# Patient Record
Sex: MALE | Race: WHITE | NOT HISPANIC OR LATINO
[De-identification: names, ages, dates, MRNs, and addresses within clinical notes are randomized per-mention and may not be internally consistent; named-entity substitution may affect disease eponyms.]

---

## 2019-12-31 ENCOUNTER — TRANSCRIPTION ENCOUNTER (OUTPATIENT)
Age: 74
End: 2019-12-31

## 2020-10-16 ENCOUNTER — OUTPATIENT (OUTPATIENT)
Dept: OUTPATIENT SERVICES | Facility: HOSPITAL | Age: 75
LOS: 1 days | End: 2020-10-16

## 2020-10-27 ENCOUNTER — APPOINTMENT (OUTPATIENT)
Dept: DISASTER EMERGENCY | Facility: CLINIC | Age: 75
End: 2020-10-27

## 2020-10-28 LAB — SARS-COV-2 N GENE NPH QL NAA+PROBE: NOT DETECTED

## 2020-10-30 ENCOUNTER — TRANSCRIPTION ENCOUNTER (OUTPATIENT)
Age: 75
End: 2020-10-30

## 2020-10-30 ENCOUNTER — INPATIENT (INPATIENT)
Facility: HOSPITAL | Age: 75
LOS: 1 days | Discharge: HOME HEALTH | End: 2020-11-01
Payer: MEDICARE

## 2020-10-30 PROCEDURE — 73560 X-RAY EXAM OF KNEE 1 OR 2: CPT | Mod: 26,LT

## 2020-12-07 ENCOUNTER — APPOINTMENT (OUTPATIENT)
Dept: ULTRASOUND IMAGING | Facility: CLINIC | Age: 75
End: 2020-12-07
Payer: MEDICARE

## 2020-12-07 ENCOUNTER — OUTPATIENT (OUTPATIENT)
Dept: OUTPATIENT SERVICES | Facility: HOSPITAL | Age: 75
LOS: 1 days | End: 2020-12-07
Payer: MEDICARE

## 2020-12-07 ENCOUNTER — APPOINTMENT (OUTPATIENT)
Dept: ORTHOPEDIC SURGERY | Facility: CLINIC | Age: 75
End: 2020-12-07
Payer: MEDICARE

## 2020-12-07 ENCOUNTER — RESULT REVIEW (OUTPATIENT)
Age: 75
End: 2020-12-07

## 2020-12-07 VITALS
SYSTOLIC BLOOD PRESSURE: 118 MMHG | DIASTOLIC BLOOD PRESSURE: 77 MMHG | TEMPERATURE: 97.3 F | BODY MASS INDEX: 29.62 KG/M2 | HEART RATE: 82 BPM | WEIGHT: 200 LBS | HEIGHT: 69 IN

## 2020-12-07 DIAGNOSIS — Z86.39 PERSONAL HISTORY OF OTHER ENDOCRINE, NUTRITIONAL AND METABOLIC DISEASE: ICD-10-CM

## 2020-12-07 DIAGNOSIS — Z96.652 PRESENCE OF LEFT ARTIFICIAL KNEE JOINT: ICD-10-CM

## 2020-12-07 PROCEDURE — 93971 EXTREMITY STUDY: CPT | Mod: 26,LT

## 2020-12-07 PROCEDURE — 99024 POSTOP FOLLOW-UP VISIT: CPT

## 2020-12-07 PROCEDURE — 93971 EXTREMITY STUDY: CPT

## 2021-01-25 ENCOUNTER — TRANSCRIPTION ENCOUNTER (OUTPATIENT)
Age: 76
End: 2021-01-25

## 2021-01-28 ENCOUNTER — FORM ENCOUNTER (OUTPATIENT)
Age: 76
End: 2021-01-28

## 2021-02-08 ENCOUNTER — APPOINTMENT (OUTPATIENT)
Dept: ORTHOPEDIC SURGERY | Facility: CLINIC | Age: 76
End: 2021-02-08

## 2021-04-03 ENCOUNTER — TRANSCRIPTION ENCOUNTER (OUTPATIENT)
Age: 76
End: 2021-04-03

## 2021-04-20 ENCOUNTER — TRANSCRIPTION ENCOUNTER (OUTPATIENT)
Age: 76
End: 2021-04-20

## 2021-05-14 ENCOUNTER — RX RENEWAL (OUTPATIENT)
Age: 76
End: 2021-05-14

## 2021-07-12 ENCOUNTER — APPOINTMENT (OUTPATIENT)
Dept: ORTHOPEDIC SURGERY | Facility: CLINIC | Age: 76
End: 2021-07-12
Payer: MEDICARE

## 2021-07-12 VITALS — SYSTOLIC BLOOD PRESSURE: 124 MMHG | DIASTOLIC BLOOD PRESSURE: 84 MMHG | HEART RATE: 68 BPM

## 2021-07-12 DIAGNOSIS — R29.4 CLICKING HIP: ICD-10-CM

## 2021-07-12 PROCEDURE — 99213 OFFICE O/P EST LOW 20 MIN: CPT

## 2021-08-02 ENCOUNTER — APPOINTMENT (OUTPATIENT)
Dept: ORTHOPEDIC SURGERY | Facility: CLINIC | Age: 76
End: 2021-08-02
Payer: MEDICARE

## 2021-08-02 VITALS — SYSTOLIC BLOOD PRESSURE: 147 MMHG | HEART RATE: 51 BPM | DIASTOLIC BLOOD PRESSURE: 90 MMHG

## 2021-08-02 PROCEDURE — 99213 OFFICE O/P EST LOW 20 MIN: CPT

## 2021-08-12 DIAGNOSIS — Z82.49 FAMILY HISTORY OF ISCHEMIC HEART DISEASE AND OTHER DISEASES OF THE CIRCULATORY SYSTEM: ICD-10-CM

## 2021-08-12 DIAGNOSIS — Z80.3 FAMILY HISTORY OF MALIGNANT NEOPLASM OF BREAST: ICD-10-CM

## 2021-08-12 DIAGNOSIS — Z78.9 OTHER SPECIFIED HEALTH STATUS: ICD-10-CM

## 2021-08-12 DIAGNOSIS — Z81.1 FAMILY HISTORY OF ALCOHOL ABUSE AND DEPENDENCE: ICD-10-CM

## 2021-09-20 ENCOUNTER — APPOINTMENT (OUTPATIENT)
Dept: FAMILY MEDICINE | Facility: CLINIC | Age: 76
End: 2021-09-20
Payer: MEDICARE

## 2021-09-20 VITALS
HEIGHT: 69 IN | HEART RATE: 85 BPM | SYSTOLIC BLOOD PRESSURE: 130 MMHG | OXYGEN SATURATION: 98 % | TEMPERATURE: 97.9 F | WEIGHT: 198 LBS | DIASTOLIC BLOOD PRESSURE: 84 MMHG | BODY MASS INDEX: 29.33 KG/M2

## 2021-09-20 DIAGNOSIS — Z00.00 ENCOUNTER FOR GENERAL ADULT MEDICAL EXAMINATION W/OUT ABNORMAL FINDINGS: ICD-10-CM

## 2021-09-20 DIAGNOSIS — G47.00 INSOMNIA, UNSPECIFIED: ICD-10-CM

## 2021-09-20 DIAGNOSIS — Z13.6 ENCOUNTER FOR SCREENING FOR CARDIOVASCULAR DISORDERS: ICD-10-CM

## 2021-09-20 DIAGNOSIS — C44.611 BASAL CELL CARCINOMA OF SKIN OF UNSPECIFIED UPPER LIMB, INCLUDING SHOULDER: ICD-10-CM

## 2021-09-20 PROCEDURE — G0439: CPT

## 2021-09-20 PROCEDURE — 90686 IIV4 VACC NO PRSV 0.5 ML IM: CPT

## 2021-09-20 PROCEDURE — 36415 COLL VENOUS BLD VENIPUNCTURE: CPT

## 2021-09-20 PROCEDURE — G0008: CPT

## 2021-09-20 NOTE — PLAN
[FreeTextEntry1] : Fasting labs were performed today . Pt scheduled with Cardiology followup next week \par Advised to undergo any preventative testing that is overdue . Flu shot administered today \par Patient will continue healthy eating and exercise and followup in one year for PE\par

## 2021-09-20 NOTE — HISTORY OF PRESENT ILLNESS
[de-identified] : Patient is here for yearly physical. He denies any new complaints . He is UTD with all preventative testing , dentist, Optho(reading)  and Derm .\par Patient is eating healthy  and exercising . Patient is Covid Vaccinated. \par Patient is fasting for physical labs today\par

## 2021-09-21 ENCOUNTER — TRANSCRIPTION ENCOUNTER (OUTPATIENT)
Age: 76
End: 2021-09-21

## 2021-09-21 LAB
ALBUMIN SERPL ELPH-MCNC: 4.5 G/DL
ALP BLD-CCNC: 56 U/L
ALT SERPL-CCNC: 13 U/L
ANION GAP SERPL CALC-SCNC: 13 MMOL/L
AST SERPL-CCNC: 16 U/L
BILIRUB SERPL-MCNC: 0.7 MG/DL
BUN SERPL-MCNC: 19 MG/DL
CALCIUM SERPL-MCNC: 9.1 MG/DL
CHLORIDE SERPL-SCNC: 104 MMOL/L
CHOLEST SERPL-MCNC: 182 MG/DL
CO2 SERPL-SCNC: 23 MMOL/L
CREAT SERPL-MCNC: 1.4 MG/DL
ESTIMATED AVERAGE GLUCOSE: 120 MG/DL
GLUCOSE SERPL-MCNC: 99 MG/DL
HBA1C MFR BLD HPLC: 5.8 %
HDLC SERPL-MCNC: 75 MG/DL
LDLC SERPL CALC-MCNC: 91 MG/DL
NONHDLC SERPL-MCNC: 107 MG/DL
POTASSIUM SERPL-SCNC: 4.5 MMOL/L
PROT SERPL-MCNC: 7.5 G/DL
PSA SERPL-MCNC: 5.13 NG/ML
SODIUM SERPL-SCNC: 139 MMOL/L
TRIGL SERPL-MCNC: 82 MG/DL

## 2021-09-22 ENCOUNTER — TRANSCRIPTION ENCOUNTER (OUTPATIENT)
Age: 76
End: 2021-09-22

## 2021-09-27 ENCOUNTER — NON-APPOINTMENT (OUTPATIENT)
Age: 76
End: 2021-09-27

## 2021-09-27 ENCOUNTER — APPOINTMENT (OUTPATIENT)
Dept: ORTHOPEDIC SURGERY | Facility: CLINIC | Age: 76
End: 2021-09-27
Payer: MEDICARE

## 2021-09-27 VITALS
SYSTOLIC BLOOD PRESSURE: 140 MMHG | BODY MASS INDEX: 29.33 KG/M2 | WEIGHT: 198 LBS | HEIGHT: 69 IN | HEART RATE: 80 BPM | DIASTOLIC BLOOD PRESSURE: 76 MMHG

## 2021-09-27 DIAGNOSIS — Z96.652 PRESENCE OF LEFT ARTIFICIAL KNEE JOINT: ICD-10-CM

## 2021-09-27 PROCEDURE — 99213 OFFICE O/P EST LOW 20 MIN: CPT

## 2021-11-27 NOTE — PHYSICAL EXAM
[No Carotid Bruits] : no carotid bruits [No Edema] : there was no peripheral edema [Grossly Normal Strength/Tone] : grossly normal strength/tone [No Rash] : no rash [No Focal Deficits] : no focal deficits [Normal] : affect was normal and insight and judgment were intact

## 2021-12-01 ENCOUNTER — APPOINTMENT (OUTPATIENT)
Dept: FAMILY MEDICINE | Facility: CLINIC | Age: 76
End: 2021-12-01
Payer: MEDICARE

## 2021-12-01 VITALS
HEART RATE: 92 BPM | SYSTOLIC BLOOD PRESSURE: 124 MMHG | HEIGHT: 69 IN | TEMPERATURE: 98.3 F | DIASTOLIC BLOOD PRESSURE: 82 MMHG | OXYGEN SATURATION: 96 % | BODY MASS INDEX: 29.62 KG/M2 | WEIGHT: 200 LBS

## 2021-12-01 PROCEDURE — 99213 OFFICE O/P EST LOW 20 MIN: CPT

## 2021-12-02 ENCOUNTER — TRANSCRIPTION ENCOUNTER (OUTPATIENT)
Age: 76
End: 2021-12-02

## 2021-12-02 NOTE — ADDENDUM
[FreeTextEntry1] : His EKG from Dr. Myers's office is within normal limits. He is medically optimized for cataract surgery.

## 2021-12-02 NOTE — ASSESSMENT
[FreeTextEntry4] : He has a history of hypertension and chronic renal failure. His most recent eGFR is 49. His blood pressure is well controlled and he does not need blood work for his preop clearance.\par \par He had an EKG done with his cardiologist earlier this year. Will request a copy of this result to include with his clearance note. Assuming the EKG was normal or stable, he may proceed with cataract surgery.

## 2021-12-02 NOTE — HISTORY OF PRESENT ILLNESS
[No Pertinent Cardiac History] : no history of aortic stenosis, atrial fibrillation, coronary artery disease, recent myocardial infarction, or implantable device/pacemaker [No Pertinent Pulmonary History] : no history of asthma, COPD, sleep apnea, or smoking [No Adverse Anesthesia Reaction] : no adverse anesthesia reaction in self or family member [Chronic Kidney Disease] : chronic kidney disease [(Patient denies any chest pain, claudication, dyspnea on exertion, orthopnea, palpitations or syncope)] : Patient denies any chest pain, claudication, dyspnea on exertion, orthopnea, palpitations or syncope [Chronic Anticoagulation] : no chronic anticoagulation [Diabetes] : no diabetes [FreeTextEntry1] : right eye cataract removal  [FreeTextEntry2] : 12/8/21 [FreeTextEntry3] : Dr. Duncan [FreeTextEntry4] : He had a cataract in his left eye which was removed 3-4 years ago. He is now scheduled for surgery on the right eye. He has had blurry vision in this eye for some time.

## 2021-12-25 NOTE — HEALTH RISK ASSESSMENT
[Very Good] : ~his/her~  mood as very good [Yes] : Yes [Monthly or less (1 pt)] : Monthly or less (1 point) Acute respiratory failure with hypoxia [3 or 4 (1 pt)] : 3 or 4  (1 point) [Never (0 pts)] : Never (0 points) [No falls in past year] : Patient reported no falls in the past year [0] : 2) Feeling down, depressed, or hopeless: Not at all (0) [Patient reported colonoscopy was normal] : Patient reported colonoscopy was normal [With Significant Other] : lives with significant other [Retired] : retired [College] : College [] :  [Sexually Active] : sexually active [Feels Safe at Home] : Feels safe at home [Reports normal functional visual acuity (ie: able to read med bottle)] : Reports normal functional visual acuity [Smoke Detector] : smoke detector [Carbon Monoxide Detector] : carbon monoxide detector [Safety elements used in home] : safety elements used in home [Seat Belt] :  uses seat belt [Sunscreen] : uses sunscreen [# of Members in Household ___] :  household currently consist of [unfilled] member(s) [# Of Children ___] : has [unfilled] children [Fully functional (bathing, dressing, toileting, transferring, walking, feeding)] : Fully functional (bathing, dressing, toileting, transferring, walking, feeding) [Fully functional (using the telephone, shopping, preparing meals, housekeeping, doing laundry, using] : Fully functional and needs no help or supervision to perform IADLs (using the telephone, shopping, preparing meals, housekeeping, doing laundry, using transportation, managing medications and managing finances) [Reports changes in hearing] : Reports changes in hearing [] : No [de-identified] : walking daily , cardio  [de-identified] : healthy diet  [Change in mental status noted] : No change in mental status noted [Reports changes in vision] : Reports no changes in vision [Reports changes in dental health] : Reports no changes in dental health [ColonoscopyDate] : 2019  [de-identified] : tinnitus

## 2022-01-04 ENCOUNTER — TRANSCRIPTION ENCOUNTER (OUTPATIENT)
Age: 77
End: 2022-01-04

## 2022-01-11 ENCOUNTER — APPOINTMENT (OUTPATIENT)
Dept: INTERNAL MEDICINE | Facility: CLINIC | Age: 77
End: 2022-01-11
Payer: MEDICARE

## 2022-01-11 VITALS
TEMPERATURE: 97.4 F | OXYGEN SATURATION: 99 % | HEIGHT: 69 IN | HEART RATE: 86 BPM | BODY MASS INDEX: 29.62 KG/M2 | RESPIRATION RATE: 16 BRPM | WEIGHT: 200 LBS

## 2022-01-11 VITALS — DIASTOLIC BLOOD PRESSURE: 84 MMHG | SYSTOLIC BLOOD PRESSURE: 148 MMHG

## 2022-01-11 PROCEDURE — 99213 OFFICE O/P EST LOW 20 MIN: CPT

## 2022-01-11 NOTE — PHYSICAL EXAM
[No Acute Distress] : no acute distress [Well Nourished] : well nourished [Well Developed] : well developed [Well-Appearing] : well-appearing [Normal Voice/Communication] : normal voice/communication [Normal Sclera/Conjunctiva] : normal sclera/conjunctiva [No Respiratory Distress] : no respiratory distress  [Clear to Auscultation] : lungs were clear to auscultation bilaterally [Normal Rate] : normal rate  [Normal S1, S2] : normal S1 and S2 [Soft] : abdomen soft [Non Tender] : non-tender [Normal Bowel Sounds] : normal bowel sounds [Normal Gait] : normal gait [Speech Grossly Normal] : speech grossly normal [Normal Affect] : the affect was normal [Normal Mood] : the mood was normal

## 2022-01-11 NOTE — HISTORY OF PRESENT ILLNESS
[FreeTextEntry1] : Pre-op.  [de-identified] : 77 y/o male presents for medical clearance. He is having right eye cataract surgery on 1/25/22 at [facility].  Patient reports doing well overall.  Denies any issues with anesthesia or bleeding in the past.  Is able to climb at least 1 flight of stairs without getting short of breath.

## 2022-01-11 NOTE — PLAN
[FreeTextEntry1] : 76-year-old male for preop evaluation.  Scheduled for right cataract repair on 1/25/22 at [facility].  EKG within 1 year reviewed.  BP slightly elevated today.  However, patient does report that he checks his BP at home and on average is noted to be within normal limits.  Patient is deemed suitable candidate for upcoming procedure.  All questions answered.  Patient voiced understanding and agreement above plan.  Return to clinic as necessary.

## 2022-01-11 NOTE — REVIEW OF SYSTEMS
[Fever] : no fever [Discharge] : no discharge [Earache] : no earache [Chest Pain] : no chest pain [Shortness Of Breath] : no shortness of breath [Cough] : no cough [Abdominal Pain] : no abdominal pain [Diarrhea] : no diarrhea [Dysuria] : no dysuria

## 2022-02-22 ENCOUNTER — TRANSCRIPTION ENCOUNTER (OUTPATIENT)
Age: 77
End: 2022-02-22

## 2022-03-16 ENCOUNTER — FORM ENCOUNTER (OUTPATIENT)
Age: 77
End: 2022-03-16

## 2022-03-18 ENCOUNTER — RX RENEWAL (OUTPATIENT)
Age: 77
End: 2022-03-18

## 2022-04-08 ENCOUNTER — APPOINTMENT (OUTPATIENT)
Dept: FAMILY MEDICINE | Facility: CLINIC | Age: 77
End: 2022-04-08
Payer: MEDICARE

## 2022-04-08 VITALS
HEART RATE: 80 BPM | HEIGHT: 69 IN | SYSTOLIC BLOOD PRESSURE: 130 MMHG | BODY MASS INDEX: 29.62 KG/M2 | DIASTOLIC BLOOD PRESSURE: 80 MMHG | TEMPERATURE: 97.2 F | WEIGHT: 200 LBS | OXYGEN SATURATION: 97 %

## 2022-04-08 VITALS — OXYGEN SATURATION: 97 % | HEART RATE: 58 BPM

## 2022-04-08 DIAGNOSIS — H81.10 BENIGN PAROXYSMAL VERTIGO, UNSPECIFIED EAR: ICD-10-CM

## 2022-04-08 PROCEDURE — 99213 OFFICE O/P EST LOW 20 MIN: CPT

## 2022-04-08 NOTE — HEALTH RISK ASSESSMENT
[Never] : Never [Yes] : Yes [4 or more  times a week (4 pts)] : 4 or more  times a week (4 points) [1 or 2 (0 pts)] : 1 or 2 (0 points) [Never (0 pts)] : Never (0 points) [No] : In the past 12 months have you used drugs other than those required for medical reasons? No [No falls in past year] : Patient reported no falls in the past year [0] : 2) Feeling down, depressed, or hopeless: Not at all (0) [PHQ-2 Negative - No further assessment needed] : PHQ-2 Negative - No further assessment needed [Audit-CScore] : 4 [de-identified] : active, gym 6x/wk, walks 8-10 miles per week [de-identified] : well balanced. [[id number]] : 0

## 2022-04-08 NOTE — HISTORY OF PRESENT ILLNESS
[FreeTextEntry8] : Pt is a 75yo male presenting to the office complaining of room spinning dizziness.\par Pt with dizzy spells x1 week.\par Intermittent, exacerbated by changes in position, dizzy spells resolve within seconds.\par Feels worse with laying flat, feels room spinning dizziness.\par Has history of similar in the past several years ago, felt symptoms were more severe than he is having now.  Has taken Meclizine in the past with improvement.\par Pt denies recent illness, URI symptoms, fever, headaches, vision/hearing changes, difficulty with speech/swallow, or extremity numbness/tingling/weakness.\par Denies chest pain, SOB, or palpations.\par Pt does admit to poor water intake.\par \par Pt has never seen a Neurologist.\par Pt follows with Cardiology, Dr. Myers.  Pt due for stress test.

## 2022-04-08 NOTE — PHYSICAL EXAM
[No JVD] : no jugular venous distention [No Edema] : there was no peripheral edema [Normal] : no rash [Coordination Grossly Intact] : coordination grossly intact [No Focal Deficits] : no focal deficits [Normal Gait] : normal gait [Normal Affect] : the affect was normal [Normal Insight/Judgement] : insight and judgment were intact [de-identified] : left horizontal nystagmus [de-identified] : CN II-XII intact, no facial asymmetry; strength 5/5; sensation intact; distal pulses intact; negative rhomberg; coordination intact with finger to nose testing.

## 2022-04-08 NOTE — ASSESSMENT
[FreeTextEntry1] : Pt is a 77yo male presenting to the office for evaluation of 1 week, mild, positional, self-resolving room spinning dizziness without any other neurologic or cardiac complaints.\par \par BPPV\par - Symptoms and PE consistent with BPPV.\par - RX for Meclizine 12.5mg q8h PRN dizziness sent to pharmacy.\par - Encouraged increased water intake, changing head positions slowly.\par - Suggested Eply maneuver at home.\par - Alert the office or go to the ED if you develop any new, worsening or concerning symptoms including fever, severe headaches, worsening/persistent dizziness, lightheadedness, loss of consciousness, vision/hearing changes, difficulty with speech/swallow, extremity numbness/tingling/weakness or any other concerning symptoms.

## 2022-04-08 NOTE — REVIEW OF SYSTEMS
[Headache] : no headache [Dizziness] : dizziness [Fainting] : no fainting [Confusion] : no confusion [Unsteady Walk] : no ataxia [Negative] : Psychiatric

## 2022-04-10 ENCOUNTER — FORM ENCOUNTER (OUTPATIENT)
Age: 77
End: 2022-04-10

## 2022-04-24 ENCOUNTER — TRANSCRIPTION ENCOUNTER (OUTPATIENT)
Age: 77
End: 2022-04-24

## 2022-05-25 ENCOUNTER — APPOINTMENT (OUTPATIENT)
Dept: FAMILY MEDICINE | Facility: CLINIC | Age: 77
End: 2022-05-25
Payer: MEDICARE

## 2022-05-25 VITALS
WEIGHT: 205 LBS | HEART RATE: 71 BPM | OXYGEN SATURATION: 97 % | HEIGHT: 69.5 IN | BODY MASS INDEX: 29.68 KG/M2 | TEMPERATURE: 97.2 F | SYSTOLIC BLOOD PRESSURE: 122 MMHG | DIASTOLIC BLOOD PRESSURE: 72 MMHG

## 2022-05-25 DIAGNOSIS — Z01.818 ENCOUNTER FOR OTHER PREPROCEDURAL EXAMINATION: ICD-10-CM

## 2022-05-25 DIAGNOSIS — H26.9 UNSPECIFIED CATARACT: ICD-10-CM

## 2022-05-25 PROCEDURE — 99213 OFFICE O/P EST LOW 20 MIN: CPT

## 2022-05-25 RX ORDER — MECLIZINE HYDROCHLORIDE 12.5 MG/1
12.5 TABLET ORAL 3 TIMES DAILY
Qty: 30 | Refills: 0 | Status: DISCONTINUED | COMMUNITY
Start: 2022-04-08 | End: 2022-05-25

## 2022-05-25 NOTE — ADDENDUM
[FreeTextEntry1] : Agree with above. Patient is medically optimized for the proposed procedure.\zhanna Escalante M.D.

## 2022-05-25 NOTE — HISTORY OF PRESENT ILLNESS
[No Pertinent Cardiac History] : no history of aortic stenosis, atrial fibrillation, coronary artery disease, recent myocardial infarction, or implantable device/pacemaker [No Pertinent Pulmonary History] : no history of asthma, COPD, sleep apnea, or smoking [No Adverse Anesthesia Reaction] : no adverse anesthesia reaction in self or family member [Chronic Kidney Disease] : chronic kidney disease [(Patient denies any chest pain, claudication, dyspnea on exertion, orthopnea, palpitations or syncope)] : Patient denies any chest pain, claudication, dyspnea on exertion, orthopnea, palpitations or syncope [Chronic Anticoagulation] : no chronic anticoagulation [Diabetes] : no diabetes [FreeTextEntry1] : Correction of left cataract [FreeTextEntry2] : 06/03/2022 [FreeTextEntry3] : Dr. Knight [FreeTextEntry4] : Patient is a 76yo male with PMH CKD, HTN, HLD who presents to the office for presurgical clearance.  Patient is scheduled for left eye cataract correction on 06/03/2022 with Dr. Knight at [facility].  \par \par Pt had cataract surgery on the left eye approximately 4 years ago, had blurry vision approximately 1 year later, has had worsening blurred vision so is now having correction of left cataract.\par \par Pt feels well otherwise, offers no complaints today. [FreeTextEntry7] : Dr. Welsh

## 2022-05-25 NOTE — ASSESSMENT
[Patient Optimized for Surgery] : Patient optimized for surgery [No Further Testing Recommended] : no further testing recommended [FreeTextEntry4] : Patient is a 78yo male with PMH CKD, HTN, HLD who presents to the office for presurgical clearance.  Patient is scheduled for left eye cataract correction on 06/03/2022 with Dr. Knight at [facility].\par \par Pt requires EKG within 1 year of surgery date.  EKG in chart from 10/2021 sinus fede, WNL.\par \par Pt optimized for surgery.

## 2022-06-01 ENCOUNTER — NON-APPOINTMENT (OUTPATIENT)
Age: 77
End: 2022-06-01

## 2022-06-10 ENCOUNTER — NON-APPOINTMENT (OUTPATIENT)
Age: 77
End: 2022-06-10

## 2022-06-30 ENCOUNTER — FORM ENCOUNTER (OUTPATIENT)
Age: 77
End: 2022-06-30

## 2022-08-25 ENCOUNTER — NON-APPOINTMENT (OUTPATIENT)
Age: 77
End: 2022-08-25

## 2022-08-25 ENCOUNTER — APPOINTMENT (OUTPATIENT)
Dept: FAMILY MEDICINE | Facility: CLINIC | Age: 77
End: 2022-08-25

## 2022-08-25 VITALS
SYSTOLIC BLOOD PRESSURE: 112 MMHG | TEMPERATURE: 97.3 F | DIASTOLIC BLOOD PRESSURE: 72 MMHG | HEIGHT: 69.5 IN | BODY MASS INDEX: 28.23 KG/M2 | HEART RATE: 76 BPM | WEIGHT: 195 LBS | OXYGEN SATURATION: 98 %

## 2022-08-25 DIAGNOSIS — G56.02 CARPAL TUNNEL SYNDROME, LEFT UPPER LIMB: ICD-10-CM

## 2022-08-25 PROCEDURE — 99213 OFFICE O/P EST LOW 20 MIN: CPT

## 2022-08-25 NOTE — ASSESSMENT
[FreeTextEntry1] : Pt is a 76yo male presenting to the office complaining of left hand numbness/tingling, worse at night and with hand  movements, consistent with carpal tunnel.  no other neurologic complaints, no cardiac complaints, neuro intact without deficit.\par \par Carpal Tunnel\par - Wrist splint advised.\par - Ibuprofen/Acetaminophen as needed.\par - Pt has seen hand surgery, Dr. Portillo, in the past and would like to go to their office for further evaluation and treatment.\par - Call the office or go to the ED immediately if you develop new, worsening or concerning symptoms including high fever, change in color/size/temperature of the extremity, weakness, inability to feel/move the extremity, inability to walk, severe HA, dizziness, LOC, chest pain, shortness of breath, palpitations, or any other concerning symptoms.\par

## 2022-08-25 NOTE — PHYSICAL EXAM
[Normal Outer Ear/Nose] : the outer ears and nose were normal in appearance [No JVD] : no jugular venous distention [No Respiratory Distress] : no respiratory distress  [No Edema] : there was no peripheral edema [Normal] : no rash [Coordination Grossly Intact] : coordination grossly intact [No Focal Deficits] : no focal deficits [Normal Gait] : normal gait [Normal Affect] : the affect was normal [Normal Insight/Judgement] : insight and judgment were intact [de-identified] : Left Hand:  no gross deformities; hand and wrist non-tender; full ROM, sensation intact distally, brisk cap refill, radial pulse 2+ and strong; negative phalen.

## 2022-08-25 NOTE — HISTORY OF PRESENT ILLNESS
[FreeTextEntry8] : Pt is a 76yo male presenting to the office complaining of carpal tunnel.\par Pt reports numbness and tingling of the left hand.\par has been ongoing for a long time, worsening over the past 1 week.\par States symptoms occur at night, when holding the newspaper, and when driving holding the steering wheel.\par Pt is RHD.\par Pt has history of similar in the past, but his whole arm was numb and went to PT with resolution.\par Pt states he does NOT have numbness of the arm, symptoms are located ONLY over the hand.\par Denies CP, SOB, palpitations, dizziness.\par Denies HA or neck pain.

## 2022-09-13 ENCOUNTER — NON-APPOINTMENT (OUTPATIENT)
Age: 77
End: 2022-09-13

## 2022-10-04 ENCOUNTER — APPOINTMENT (OUTPATIENT)
Dept: FAMILY MEDICINE | Facility: CLINIC | Age: 77
End: 2022-10-04

## 2022-10-04 VITALS
HEART RATE: 77 BPM | TEMPERATURE: 98.2 F | WEIGHT: 198 LBS | SYSTOLIC BLOOD PRESSURE: 130 MMHG | OXYGEN SATURATION: 98 % | HEIGHT: 69 IN | DIASTOLIC BLOOD PRESSURE: 85 MMHG | BODY MASS INDEX: 29.33 KG/M2

## 2022-10-04 DIAGNOSIS — Z23 ENCOUNTER FOR IMMUNIZATION: ICD-10-CM

## 2022-10-04 LAB
BILIRUB UR QL STRIP: NEGATIVE
CLARITY UR: CLEAR
GLUCOSE UR-MCNC: NEGATIVE
HCG UR QL: 0.2 EU/DL
HGB UR QL STRIP.AUTO: NORMAL
KETONES UR-MCNC: NEGATIVE
LEUKOCYTE ESTERASE UR QL STRIP: NEGATIVE
NITRITE UR QL STRIP: NEGATIVE
PH UR STRIP: 5.5
PROT UR STRIP-MCNC: NEGATIVE
SP GR UR STRIP: 1.02

## 2022-10-04 PROCEDURE — G0008: CPT

## 2022-10-04 PROCEDURE — 36415 COLL VENOUS BLD VENIPUNCTURE: CPT

## 2022-10-04 PROCEDURE — 90662 IIV NO PRSV INCREASED AG IM: CPT

## 2022-10-04 PROCEDURE — G0439: CPT

## 2022-10-04 PROCEDURE — 81003 URINALYSIS AUTO W/O SCOPE: CPT | Mod: QW

## 2022-10-04 NOTE — PLAN
[FreeTextEntry1] : Blood work done in office today. Will follow up on results with patient.\par Extensive counseling provided on lifestyle modifications (healthy diet, daily exercise, routine screenings). \par derm - every 6 months \par colo - due, patient will call \par cardio- has an upcoming appointment, due for a stress test \par urolog- routine f/u \par Return for CPE in 1 year.

## 2022-10-04 NOTE — HEALTH RISK ASSESSMENT
[Never] : Never [4 or more  times a week (4 pts)] : 4 or more  times a week (4 points) [No] : In the past 12 months have you used drugs other than those required for medical reasons? No [0] : 2) Feeling down, depressed, or hopeless: Not at all (0) [de-identified] : gym 4-5x week, walking, golf  [de-identified] : well balanced  [MVL4Hbush] : 0

## 2022-10-05 LAB
ALBUMIN SERPL ELPH-MCNC: 4.6 G/DL
ALP BLD-CCNC: 75 U/L
ALT SERPL-CCNC: 15 U/L
ANION GAP SERPL CALC-SCNC: 14 MMOL/L
AST SERPL-CCNC: 15 U/L
BASOPHILS # BLD AUTO: 0.02 K/UL
BASOPHILS NFR BLD AUTO: 0.4 %
BILIRUB SERPL-MCNC: 0.6 MG/DL
BUN SERPL-MCNC: 26 MG/DL
CALCIUM SERPL-MCNC: 9.1 MG/DL
CHLORIDE SERPL-SCNC: 104 MMOL/L
CHOLEST SERPL-MCNC: 195 MG/DL
CO2 SERPL-SCNC: 21 MMOL/L
CREAT SERPL-MCNC: 1.51 MG/DL
EGFR: 48 ML/MIN/1.73M2
EOSINOPHIL # BLD AUTO: 0.09 K/UL
EOSINOPHIL NFR BLD AUTO: 1.6 %
ESTIMATED AVERAGE GLUCOSE: 117 MG/DL
GLUCOSE SERPL-MCNC: 101 MG/DL
HBA1C MFR BLD HPLC: 5.7 %
HCT VFR BLD CALC: 40.4 %
HDLC SERPL-MCNC: 70 MG/DL
HGB BLD-MCNC: 13.4 G/DL
IMM GRANULOCYTES NFR BLD AUTO: 0.4 %
LDLC SERPL CALC-MCNC: 111 MG/DL
LYMPHOCYTES # BLD AUTO: 1.68 K/UL
LYMPHOCYTES NFR BLD AUTO: 30.8 %
MAN DIFF?: NORMAL
MCHC RBC-ENTMCNC: 31.2 PG
MCHC RBC-ENTMCNC: 33.2 GM/DL
MCV RBC AUTO: 94 FL
MONOCYTES # BLD AUTO: 0.5 K/UL
MONOCYTES NFR BLD AUTO: 9.2 %
NEUTROPHILS # BLD AUTO: 3.15 K/UL
NEUTROPHILS NFR BLD AUTO: 57.6 %
NONHDLC SERPL-MCNC: 124 MG/DL
PLATELET # BLD AUTO: 263 K/UL
POTASSIUM SERPL-SCNC: 4.5 MMOL/L
PROT SERPL-MCNC: 7.7 G/DL
PSA FREE FLD-MCNC: 14 %
PSA FREE SERPL-MCNC: 1 NG/ML
PSA SERPL-MCNC: 6.94 NG/ML
RBC # BLD: 4.3 M/UL
RBC # FLD: 13.1 %
SODIUM SERPL-SCNC: 139 MMOL/L
T3FREE SERPL-MCNC: 2.88 PG/ML
T4 FREE SERPL-MCNC: 1.2 NG/DL
TRIGL SERPL-MCNC: 68 MG/DL
TSH SERPL-ACNC: 0.77 UIU/ML
WBC # FLD AUTO: 5.46 K/UL

## 2022-10-18 ENCOUNTER — FORM ENCOUNTER (OUTPATIENT)
Age: 77
End: 2022-10-18

## 2022-10-19 ENCOUNTER — LABORATORY RESULT (OUTPATIENT)
Age: 77
End: 2022-10-19

## 2022-11-15 ENCOUNTER — APPOINTMENT (OUTPATIENT)
Dept: CARDIOTHORACIC SURGERY | Facility: CLINIC | Age: 77
End: 2022-11-15

## 2022-11-15 VITALS
BODY MASS INDEX: 28.88 KG/M2 | WEIGHT: 195 LBS | HEIGHT: 69 IN | TEMPERATURE: 97.9 F | SYSTOLIC BLOOD PRESSURE: 136 MMHG | RESPIRATION RATE: 16 BRPM | OXYGEN SATURATION: 98 % | DIASTOLIC BLOOD PRESSURE: 85 MMHG | HEART RATE: 67 BPM

## 2022-11-15 PROCEDURE — 99205 OFFICE O/P NEW HI 60 MIN: CPT

## 2022-11-15 NOTE — DATA REVIEWED
[FreeTextEntry1] : Transthoracic Echocardiogram at [facility] on 10/19/22:\par 1. Left ventricular ejection fraction was normal, estimated in the range of 55-60%. The left ventricular cavity size appears normal. The left ventricular wall thickness is mildly increased. The left ventricle is thickened consistent with mild concentric hypertrophy. Global systolic function was normal. There is evidence of left ventricular diastolic dysfunction. \par 2. The right ventricular cavity size appears normal. The right ventricular systolic function appears normal.\par 3. The left atrial size is normal.\par 4. The right atrial size is normal.\par 5. There is evidence of mild calcification of the aortic valve. There is evidence of trivial (trace) aortic regurgitation.\par 6. There is evidence of dilation in the aortic root and the ascending aorta. Aortic root moderately dilated. Ascending aorta mildly dilated.\par 7. There is evidence or normal respirophasic changes (>50% collapse) of the inferior vena cava.\par 8. Compared to study on 4/7/21, the aortic root has increased in size.

## 2022-11-15 NOTE — HISTORY OF PRESENT ILLNESS
[FreeTextEntry1] : Jose A Edmond is a 77 year old male referred Dr Chasity Myers who presents for consultation.\par \par Past medical history includes hypertension, hypercholesteremia, left anterior fascicular block, arthritis, aortic root enlargement, bradycardia. There is no family history of sudden cardiac death or aortic aneurysm. \par \par Today the patient is feeling overall well and has recently undergone Carpal Tunnel surgery. He reports having well controlled blood pressures with Lisinopril and follow gomez his care with his PCP and Cardiology. Reported blood pressures are in the 120's systolic.

## 2022-11-15 NOTE — ASSESSMENT
[FreeTextEntry1] : Mr Edmond reports to the office today to discuss recent imaging and known aortic dilation. MRA imaging and echocardiogram were reviewed and there is no need for surgical intervention at this time. Typically Beta Blocker is recommended, however given his history of bradycardia this may be deferred. \par \par We discussed that he is an avid  and there is no evidence that atmospheres would increase the pressures. We did discuss that he should refrain from heavy lifting when vagal maneuvers are involved.\par \par PLAN:\par - Maintain blood pressure control (recommend beta blocker unless bradycardic)\par - Aortic Registry\par - Transthoracic Echocardiogram in 6 months at [facility] for valve anatomy\par - CTA of the chest in 1 year\par \par \par \par \par \par \par Manny CLAY NP am scribing for and in the presence of Dr. Reyes the following sections HISTORY OF PRESENT ILLNESS, PAST MEDICAL/FAMILY/SOCIAL HISTORY; REVIEW OF SYSTEMS; VITAL SIGNS; PHYSICAL EXAM; DISPOSITION.\par \par "I personally performed the services described in the documentation, reviewed the documentation recorded by the scribe in my presence and accurately and completely records my words and actions."\par

## 2022-11-15 NOTE — CONSULT LETTER
[Dear  ___] : Dear  [unfilled], [Consult Letter:] : I had the pleasure of evaluating your patient, [unfilled]. [Please see my note below.] : Please see my note below. [Consult Closing:] : Thank you very much for allowing me to participate in the care of this patient.  If you have any questions, please do not hesitate to contact me. [Sincerely,] : Sincerely, [FreeTextEntry2] : Chasity Myers MD [FreeTextEntry3] : Armond Reyes MD\par  of Cardiothoracic Surgery\par [facility]\par [address] \[place] [address]\par [phone]\par

## 2022-11-15 NOTE — PHYSICAL EXAM
[General Appearance - Well Nourished] : well nourished [General Appearance - Well Developed] : well developed [Sclera] : the sclera and conjunctiva were normal [Outer Ear] : the ears and nose were normal in appearance [Neck Appearance] : the appearance of the neck was normal [Respiration, Rhythm And Depth] : normal respiratory rhythm and effort [Auscultation Breath Sounds / Voice Sounds] : lungs were clear to auscultation bilaterally [Heart Rate And Rhythm] : heart rate was normal and rhythm regular [Examination Of The Chest] : the chest was normal in appearance [Abnormal Walk] : normal gait [Skin Color & Pigmentation] : normal skin color and pigmentation [Sensation] : the sensory exam was normal to light touch and pinprick [Motor Exam] : the motor exam was normal [Oriented To Time, Place, And Person] : oriented to person, place, and time [Impaired Insight] : insight and judgment were intact

## 2022-11-15 NOTE — REVIEW OF SYSTEMS
[Negative] : Heme/Lymph [Feeling Poorly] : not feeling poorly [Feeling Tired] : not feeling tired [Chest Pain] : no chest pain [Palpitations] : no palpitations [Shortness Of Breath] : no shortness of breath [SOB on Exertion] : no shortness of breath during exertion

## 2023-01-15 ENCOUNTER — NON-APPOINTMENT (OUTPATIENT)
Age: 78
End: 2023-01-15

## 2023-01-15 ENCOUNTER — FORM ENCOUNTER (OUTPATIENT)
Age: 78
End: 2023-01-15

## 2023-01-16 ENCOUNTER — FORM ENCOUNTER (OUTPATIENT)
Age: 78
End: 2023-01-16

## 2023-01-24 ENCOUNTER — NON-APPOINTMENT (OUTPATIENT)
Age: 78
End: 2023-01-24

## 2023-01-27 ENCOUNTER — APPOINTMENT (OUTPATIENT)
Dept: FAMILY MEDICINE | Facility: CLINIC | Age: 78
End: 2023-01-27
Payer: MEDICARE

## 2023-01-27 VITALS
OXYGEN SATURATION: 97 % | SYSTOLIC BLOOD PRESSURE: 122 MMHG | HEIGHT: 69 IN | BODY MASS INDEX: 28.88 KG/M2 | WEIGHT: 195 LBS | DIASTOLIC BLOOD PRESSURE: 86 MMHG | HEART RATE: 100 BPM | TEMPERATURE: 97.7 F

## 2023-01-27 DIAGNOSIS — Z01.818 ENCOUNTER FOR OTHER PREPROCEDURAL EXAMINATION: ICD-10-CM

## 2023-01-27 DIAGNOSIS — F33.8 OTHER RECURRENT DEPRESSIVE DISORDERS: Chronic | ICD-10-CM

## 2023-01-27 PROCEDURE — 36415 COLL VENOUS BLD VENIPUNCTURE: CPT

## 2023-01-27 PROCEDURE — 99214 OFFICE O/P EST MOD 30 MIN: CPT | Mod: 25

## 2023-01-27 RX ORDER — TADALAFIL 20 MG/1
20 TABLET ORAL
Refills: 0 | Status: DISCONTINUED | COMMUNITY
Start: 2021-08-12 | End: 2023-01-27

## 2023-01-27 NOTE — PHYSICAL EXAM
[No Carotid Bruits] : no carotid bruits [No Edema] : there was no peripheral edema [Soft] : abdomen soft [Non Tender] : non-tender [Grossly Normal Strength/Tone] : grossly normal strength/tone [No Rash] : no rash [No Focal Deficits] : no focal deficits [Normal] : affect was normal and insight and judgment were intact

## 2023-01-29 LAB
ALBUMIN SERPL ELPH-MCNC: 4.9 G/DL
ALP BLD-CCNC: 69 U/L
ALT SERPL-CCNC: 22 U/L
ANION GAP SERPL CALC-SCNC: 14 MMOL/L
APPEARANCE: CLEAR
AST SERPL-CCNC: 15 U/L
BACTERIA UR CULT: NORMAL
BACTERIA: NEGATIVE
BASOPHILS # BLD AUTO: 0.06 K/UL
BASOPHILS NFR BLD AUTO: 0.5 %
BILIRUB SERPL-MCNC: 0.6 MG/DL
BILIRUBIN URINE: NEGATIVE
BLOOD URINE: NEGATIVE
BUN SERPL-MCNC: 34 MG/DL
CALCIUM SERPL-MCNC: 10.2 MG/DL
CHLORIDE SERPL-SCNC: 98 MMOL/L
CO2 SERPL-SCNC: 24 MMOL/L
COLOR: COLORLESS
CREAT SERPL-MCNC: 1.41 MG/DL
EGFR: 52 ML/MIN/1.73M2
EOSINOPHIL # BLD AUTO: 0.03 K/UL
EOSINOPHIL NFR BLD AUTO: 0.2 %
GLUCOSE QUALITATIVE U: NEGATIVE
GLUCOSE SERPL-MCNC: 97 MG/DL
HCT VFR BLD CALC: 45 %
HGB BLD-MCNC: 15.1 G/DL
HYALINE CASTS: 0 /LPF
IMM GRANULOCYTES NFR BLD AUTO: 0.5 %
KETONES URINE: NEGATIVE
LEUKOCYTE ESTERASE URINE: NEGATIVE
LYMPHOCYTES # BLD AUTO: 2.08 K/UL
LYMPHOCYTES NFR BLD AUTO: 16 %
MAN DIFF?: NORMAL
MCHC RBC-ENTMCNC: 31.3 PG
MCHC RBC-ENTMCNC: 33.6 GM/DL
MCV RBC AUTO: 93.2 FL
MICROSCOPIC-UA: NORMAL
MONOCYTES # BLD AUTO: 0.8 K/UL
MONOCYTES NFR BLD AUTO: 6.2 %
NEUTROPHILS # BLD AUTO: 9.96 K/UL
NEUTROPHILS NFR BLD AUTO: 76.6 %
NITRITE URINE: NEGATIVE
PH URINE: 6
PLATELET # BLD AUTO: 374 K/UL
POTASSIUM SERPL-SCNC: 4.8 MMOL/L
PROT SERPL-MCNC: 8.4 G/DL
PROTEIN URINE: NEGATIVE
RBC # BLD: 4.83 M/UL
RBC # FLD: 12.3 %
RED BLOOD CELLS URINE: 0 /HPF
SODIUM SERPL-SCNC: 137 MMOL/L
SPECIFIC GRAVITY URINE: 1.01
SQUAMOUS EPITHELIAL CELLS: 0 /HPF
UROBILINOGEN URINE: NORMAL
WBC # FLD AUTO: 13 K/UL
WHITE BLOOD CELLS URINE: 0 /HPF

## 2023-02-02 ENCOUNTER — NON-APPOINTMENT (OUTPATIENT)
Age: 78
End: 2023-02-02

## 2023-02-02 NOTE — ADDENDUM
[FreeTextEntry1] : Labs reviewed. His urinalysis and urine culture are negative. His creatinine is lower than last time. His WBC count is elevated but he was on prednisone for gout when his labs were done. Based upon these labs he may proceed with his surgery as scheduled.

## 2023-02-02 NOTE — HISTORY OF PRESENT ILLNESS
[No Pertinent Cardiac History] : no history of aortic stenosis, atrial fibrillation, coronary artery disease, recent myocardial infarction, or implantable device/pacemaker [No Pertinent Pulmonary History] : no history of asthma, COPD, sleep apnea, or smoking [No Adverse Anesthesia Reaction] : no adverse anesthesia reaction in self or family member [Chronic Kidney Disease] : chronic kidney disease [(Patient denies any chest pain, claudication, dyspnea on exertion, orthopnea, palpitations or syncope)] : Patient denies any chest pain, claudication, dyspnea on exertion, orthopnea, palpitations or syncope [Chronic Anticoagulation] : no chronic anticoagulation [Diabetes] : no diabetes [FreeTextEntry1] : prostate biopsy [FreeTextEntry2] : 2/7/2023 [FreeTextEntry3] : Dr. Rosio White [FreeTextEntry4] : Patient has a history of an elevated PSA, monitored by Urology. His PSA was 5.13 in 9/2021 and 6.94 in 10/2022. He is scheduled for a prostate biopsy for further evaluation.

## 2023-02-02 NOTE — PLAN
[FreeTextEntry1] : Based on examination there are no acute contraindications to proceeding with above listed surgery at this time pending review of preoperative test results. Patient was advised to avoid all NSAIDs/ vitamins/ supplements for 1 week prior to surgery. He may resume these post-operatively when advised to do so by his surgeon. Reviewed risks of constipation and DVT post operatively and ways to decrease risk for these issues, including limiting  frequency and duration of opioid medication, increasing  fluid and fiber intake, early and frequent mobilization, and if necessary using Miralax and/or Dulcolax (short term).

## 2023-02-02 NOTE — ASSESSMENT
[FreeTextEntry4] : Patient is scheduled for a prostate biopsy as above. He has a history of hypertension, hyperlipidemia, aortic root dilation, PACs, and LAFB on his EKG. He was seen for cardiac clearance by his Dr. Myers, who felt there was no cardiac contraindication to surgery.\par \par His surgeon requested a CBC, CMP, urinalysis, and urine culture as well as an EKG for medical clearance. The EKG was done by Dr. Myers so he does not needs another one today.\par \par He has a history of chronic kidney disease. His creatinine was 1.40 in 9/2021 and 1.51 in 10/2022. He was recently diagnosed with gout likely related to his chronic renal disease as well.

## 2023-02-14 ENCOUNTER — FORM ENCOUNTER (OUTPATIENT)
Age: 78
End: 2023-02-14

## 2023-02-17 ENCOUNTER — APPOINTMENT (OUTPATIENT)
Dept: FAMILY MEDICINE | Facility: CLINIC | Age: 78
End: 2023-02-17
Payer: MEDICARE

## 2023-02-17 PROCEDURE — 36415 COLL VENOUS BLD VENIPUNCTURE: CPT

## 2023-02-18 LAB
ALBUMIN SERPL ELPH-MCNC: 4.5 G/DL
ALP BLD-CCNC: 70 U/L
ALT SERPL-CCNC: 17 U/L
ANION GAP SERPL CALC-SCNC: 12 MMOL/L
AST SERPL-CCNC: 20 U/L
BASOPHILS # BLD AUTO: 0.05 K/UL
BASOPHILS NFR BLD AUTO: 1 %
BILIRUB SERPL-MCNC: 0.4 MG/DL
BUN SERPL-MCNC: 19 MG/DL
CALCIUM SERPL-MCNC: 9.8 MG/DL
CHLORIDE SERPL-SCNC: 104 MMOL/L
CO2 SERPL-SCNC: 24 MMOL/L
CREAT SERPL-MCNC: 1.48 MG/DL
EGFR: 49 ML/MIN/1.73M2
EOSINOPHIL # BLD AUTO: 0.13 K/UL
EOSINOPHIL NFR BLD AUTO: 2.5 %
GLUCOSE SERPL-MCNC: 95 MG/DL
HCT VFR BLD CALC: 40.8 %
HGB BLD-MCNC: 13 G/DL
IMM GRANULOCYTES NFR BLD AUTO: 0.2 %
LYMPHOCYTES # BLD AUTO: 1.68 K/UL
LYMPHOCYTES NFR BLD AUTO: 32.2 %
MAN DIFF?: NORMAL
MCHC RBC-ENTMCNC: 30.7 PG
MCHC RBC-ENTMCNC: 31.9 GM/DL
MCV RBC AUTO: 96.2 FL
MONOCYTES # BLD AUTO: 0.65 K/UL
MONOCYTES NFR BLD AUTO: 12.5 %
NEUTROPHILS # BLD AUTO: 2.69 K/UL
NEUTROPHILS NFR BLD AUTO: 51.6 %
PLATELET # BLD AUTO: 304 K/UL
POTASSIUM SERPL-SCNC: 5.7 MMOL/L
PROT SERPL-MCNC: 7.5 G/DL
RBC # BLD: 4.24 M/UL
RBC # FLD: 12.9 %
SODIUM SERPL-SCNC: 140 MMOL/L
URATE SERPL-MCNC: 8.8 MG/DL
WBC # FLD AUTO: 5.21 K/UL

## 2023-02-23 ENCOUNTER — APPOINTMENT (OUTPATIENT)
Dept: FAMILY MEDICINE | Facility: CLINIC | Age: 78
End: 2023-02-23
Payer: MEDICARE

## 2023-02-23 VITALS
BODY MASS INDEX: 28.88 KG/M2 | DIASTOLIC BLOOD PRESSURE: 68 MMHG | WEIGHT: 195 LBS | TEMPERATURE: 97 F | SYSTOLIC BLOOD PRESSURE: 110 MMHG | HEIGHT: 69 IN | OXYGEN SATURATION: 97 % | HEART RATE: 87 BPM

## 2023-02-23 PROCEDURE — 99214 OFFICE O/P EST MOD 30 MIN: CPT

## 2023-02-23 NOTE — HISTORY OF PRESENT ILLNESS
[FreeTextEntry1] : DELONTE TIWARI is a 77 year old male here for a follow up visit.  [de-identified] : Patient has a history of an elevated PSA, monitored by Urology. His PSA was 5.13 in 9/2021 and 6.94 in 10/2022. He had  a prostate biopsy on 2/7 for further evaluation.\par \par He states that the biopsy was positive for prostate cancer, with [place] scores of 6, 7, and 8. He met with Dr. Espinosa who recommended radiation therapy. He is scheduled to start androgen deprivation therapy next week and will start radiation in July.\par \par He also had a history of chronic kidney disease. He just had labs done last month for a preop visit and his creatinine was stable at 1.41.  His preop labs also included a CBC which revealed an elevated WBC count. He had recently completed a course of prednisone for gout and this was felt to be the cause. He was advised to return to repeat his CBC.\par

## 2023-02-23 NOTE — HEALTH RISK ASSESSMENT
[No falls in past year] : Patient reported no falls in the past year [0] : 2) Feeling down, depressed, or hopeless: Not at all (0) [PHQ-2 Negative - No further assessment needed] : PHQ-2 Negative - No further assessment needed [Never] : Never [GTG2Taeav] : 0

## 2023-02-23 NOTE — PLAN
[FreeTextEntry1] : He agrees to start allopurinol to lower his uric acid and prevent future gout attacks. No renal dose adjustment is needed based upon his current GFR. Will start with 100 mg daily. He will return for a lab visit to recheck his uric acid in one month. Will increase allopurinol as needed to get uric acid under 6.0.\par \par Discussed clean eating (e.g. Mediterranean style diet) and recommendations for regular exercise/staying as physically active as possible.\par \par Reviewed importance of good self care (e.g. meditation, yoga, adequate rest, regular exercise, magnesium, clean eating, etc.).\par

## 2023-02-23 NOTE — ASSESSMENT
[FreeTextEntry1] : He has a history of hypertension, hyperlipidemia, aortic root dilation, PACs, chronic kidney disease, elevated PSA, and gout. He is here to follow up after his WBC count was elevated last month.\par \par His labs were done prior. His WBC count returned to normal after he completed prednisone. His creatinine remains elevated at 1.48. His uric acid is elevated at 8.8. He has only had 2 gout attacks in his life, both within the past year, and the most recent one was severe.

## 2023-03-08 ENCOUNTER — FORM ENCOUNTER (OUTPATIENT)
Age: 78
End: 2023-03-08

## 2023-03-27 ENCOUNTER — APPOINTMENT (OUTPATIENT)
Dept: FAMILY MEDICINE | Facility: CLINIC | Age: 78
End: 2023-03-27
Payer: MEDICARE

## 2023-03-27 PROCEDURE — 36415 COLL VENOUS BLD VENIPUNCTURE: CPT

## 2023-03-28 ENCOUNTER — TRANSCRIPTION ENCOUNTER (OUTPATIENT)
Age: 78
End: 2023-03-28

## 2023-03-28 LAB — URATE SERPL-MCNC: 6.2 MG/DL

## 2023-04-03 ENCOUNTER — FORM ENCOUNTER (OUTPATIENT)
Age: 78
End: 2023-04-03

## 2023-04-13 ENCOUNTER — NON-APPOINTMENT (OUTPATIENT)
Age: 78
End: 2023-04-13

## 2023-04-13 ENCOUNTER — FORM ENCOUNTER (OUTPATIENT)
Age: 78
End: 2023-04-13

## 2023-04-13 ENCOUNTER — APPOINTMENT (OUTPATIENT)
Dept: RHEUMATOLOGY | Facility: CLINIC | Age: 78
End: 2023-04-13

## 2023-05-19 ENCOUNTER — FORM ENCOUNTER (OUTPATIENT)
Age: 78
End: 2023-05-19

## 2023-05-19 ENCOUNTER — NON-APPOINTMENT (OUTPATIENT)
Age: 78
End: 2023-05-19

## 2023-05-21 ENCOUNTER — FORM ENCOUNTER (OUTPATIENT)
Age: 78
End: 2023-05-21

## 2023-05-24 ENCOUNTER — TRANSCRIPTION ENCOUNTER (OUTPATIENT)
Age: 78
End: 2023-05-24

## 2023-05-24 RX ORDER — ESCITALOPRAM OXALATE 10 MG/1
10 TABLET ORAL
Qty: 90 | Refills: 3 | Status: ACTIVE | COMMUNITY
Start: 2023-05-24 | End: 1900-01-01

## 2023-07-27 ENCOUNTER — NON-APPOINTMENT (OUTPATIENT)
Age: 78
End: 2023-07-27

## 2023-08-21 ENCOUNTER — RX RENEWAL (OUTPATIENT)
Age: 78
End: 2023-08-21

## 2023-09-26 ENCOUNTER — TRANSCRIPTION ENCOUNTER (OUTPATIENT)
Age: 78
End: 2023-09-26

## 2023-09-26 DIAGNOSIS — Z96.60 PRESENCE OF UNSPECIFIED ORTHOPEDIC JOINT IMPLANT: ICD-10-CM

## 2023-09-26 RX ORDER — AMOXICILLIN 500 MG/1
500 CAPSULE ORAL
Qty: 12 | Refills: 1 | Status: ACTIVE | COMMUNITY
Start: 2023-09-26 | End: 1900-01-01

## 2023-10-24 ENCOUNTER — APPOINTMENT (OUTPATIENT)
Dept: CARDIOTHORACIC SURGERY | Facility: CLINIC | Age: 78
End: 2023-10-24
Payer: MEDICARE

## 2023-10-24 DIAGNOSIS — Z85.46 PERSONAL HISTORY OF MALIGNANT NEOPLASM OF PROSTATE: ICD-10-CM

## 2023-10-24 PROCEDURE — 99442: CPT | Mod: 95

## 2023-10-24 RX ORDER — ABIRATERONE ACETATE 250 MG/1
250 TABLET, FILM COATED ORAL
Refills: 0 | Status: ACTIVE | COMMUNITY

## 2023-10-24 RX ORDER — PREDNISONE 5 MG
5 TABLET, DOSE PACK ORAL
Refills: 0 | Status: ACTIVE | COMMUNITY

## 2023-10-24 RX ORDER — INDOMETHACIN 50 MG/1
50 CAPSULE ORAL
Refills: 0 | Status: COMPLETED | COMMUNITY
End: 2023-10-24

## 2023-10-24 RX ORDER — ZOLPIDEM TARTRATE 10 MG/1
10 TABLET ORAL
Qty: 30 | Refills: 0 | Status: COMPLETED | COMMUNITY
Start: 2021-08-12 | End: 2023-10-24

## 2023-11-06 ENCOUNTER — APPOINTMENT (OUTPATIENT)
Dept: FAMILY MEDICINE | Facility: CLINIC | Age: 78
End: 2023-11-06
Payer: MEDICARE

## 2023-11-06 VITALS
BODY MASS INDEX: 29.62 KG/M2 | TEMPERATURE: 97 F | WEIGHT: 200 LBS | DIASTOLIC BLOOD PRESSURE: 68 MMHG | HEIGHT: 69 IN | HEART RATE: 93 BPM | SYSTOLIC BLOOD PRESSURE: 102 MMHG | OXYGEN SATURATION: 97 %

## 2023-11-06 DIAGNOSIS — N18.31 CHRONIC KIDNEY DISEASE, STAGE 3A: ICD-10-CM

## 2023-11-06 DIAGNOSIS — M10.9 GOUT, UNSPECIFIED: ICD-10-CM

## 2023-11-06 DIAGNOSIS — E78.5 HYPERLIPIDEMIA, UNSPECIFIED: ICD-10-CM

## 2023-11-06 DIAGNOSIS — R97.20 ELEVATED PROSTATE, SPECIFIC ANTIGEN [PSA]: ICD-10-CM

## 2023-11-06 DIAGNOSIS — C61 MALIGNANT NEOPLASM OF PROSTATE: ICD-10-CM

## 2023-11-06 DIAGNOSIS — Z00.00 ENCOUNTER FOR GENERAL ADULT MEDICAL EXAMINATION W/OUT ABNORMAL FINDINGS: ICD-10-CM

## 2023-11-06 PROCEDURE — G0439: CPT

## 2023-11-06 PROCEDURE — 36415 COLL VENOUS BLD VENIPUNCTURE: CPT

## 2023-11-07 ENCOUNTER — TRANSCRIPTION ENCOUNTER (OUTPATIENT)
Age: 78
End: 2023-11-07

## 2023-11-07 LAB
ALBUMIN SERPL ELPH-MCNC: 4.6 G/DL
ALP BLD-CCNC: 87 U/L
ALT SERPL-CCNC: 17 U/L
ANION GAP SERPL CALC-SCNC: 11 MMOL/L
AST SERPL-CCNC: 20 U/L
BILIRUB SERPL-MCNC: 0.8 MG/DL
BUN SERPL-MCNC: 30 MG/DL
CALCIUM SERPL-MCNC: 9.6 MG/DL
CHLORIDE SERPL-SCNC: 104 MMOL/L
CHOLEST SERPL-MCNC: 173 MG/DL
CO2 SERPL-SCNC: 25 MMOL/L
CREAT SERPL-MCNC: 1.46 MG/DL
EGFR: 49 ML/MIN/1.73M2
ESTIMATED AVERAGE GLUCOSE: 126 MG/DL
GLUCOSE SERPL-MCNC: 108 MG/DL
HBA1C MFR BLD HPLC: 6 %
HDLC SERPL-MCNC: 62 MG/DL
LDLC SERPL CALC-MCNC: 93 MG/DL
NONHDLC SERPL-MCNC: 110 MG/DL
POTASSIUM SERPL-SCNC: 4.9 MMOL/L
PROT SERPL-MCNC: 7.5 G/DL
SODIUM SERPL-SCNC: 139 MMOL/L
TRIGL SERPL-MCNC: 97 MG/DL

## 2023-11-10 NOTE — H&P ADULT - ASSESSMENT
78 year old male with PMHx HTN, HLD, prostate CA, aortic root enlargement with progressive SOB on exertion.  CTA thoracic aorta showing stable aorta size 4.8cm and extensive LAD and RCA calcification. Referred for [facility]   ASA class:  Creatinine:  GFR:  Bleeding  Risk score:  Josr Score:  78 year old male with PMHx HTN, HLD, prostate CA, aortic root enlargement with progressive SOB on exertion.  CTA thoracic aorta showing stable aorta size 4.8cm and extensive LAD and RCA calcification. Referred for [facility]   ASA class:II  Creatinine:1.36  GFR:54  Bleeding  Risk score:4.9%  Josr Score: 8 points   ml x1 hour  -Consent obtained for cardiac catheterization w/ coronary angiogram and possible stent placement, with possible sedation and analgesia. Pt is competent, has capacity, and understands risks and benefits of procedure. Risks and benefits discussed. Risk discussed included, but not limited to MI, stroke, mortality, major bleeding, arrythmia, or infection. All questions answered

## 2023-11-10 NOTE — H&P ADULT - NSHPPHYSICALEXAM_GEN_ALL_CORE
PHYSICAL EXAM:  Vital Signs Last 24 Hrs  T(C): 36.9 (13 Nov 2023 10:00), Max: 36.9 (13 Nov 2023 10:00)  T(F): 98.4 (13 Nov 2023 10:00), Max: 98.4 (13 Nov 2023 10:00)  HR: 62 (13 Nov 2023 10:00) (62 - 62)  BP: 154/91 (13 Nov 2023 10:00) (154/91 - 154/91)  BP(mean): --  RR: 16 (13 Nov 2023 10:00) (16 - 16)  SpO2: 98% (13 Nov 2023 10:00) (98% - 98%)      Psychiatric: Normal mood, normal affect  Musculoskeletal: 5/5 strength b/l upper and lower extremities  Constitutional: NAD, well-groomed, well-developed  Neuro: Alert and oriented x 3 Gait steady Speech clear No focal deficits  Neck: No JVD No bruit  Respiratory: CTAB  Cardiovascular: S1 and S2, RRR,   Gastrointestinal: BS+, soft, NT/ND  Extremities: No clubbing cyanosis or edema No varicosities  Vascular: 2+ peripheral pulses  Psychiatric: Normal mood, normal affect  Musculoskeletal: 5/5 strength b/l upper and lower extremities

## 2023-11-10 NOTE — H&P ADULT - NSHPLABSRESULTS_GEN_ALL_CORE
10/4/23 CTA of chest:: Extensive calcified plaque of LAD and moderate calcified plaque of RCA. Dilated aortic root 4.8 cm  1/09/23 EKG: NSR  11/3/23 TTE: EF 60-65% 10/4/23 CTA of chest:: Extensive calcified plaque of LAD and moderate calcified plaque of RCA. Dilated aortic root 4.8 cm  1/09/23 EKG: NSR  11/3/23 TTE: EF 60-65%                        11.0   4.28  )-----------( 198      ( 13 Nov 2023 09:50 )             32.3     11-13 @ 09:50    141 | 109 | 17  /8.6 | -- | --  _______________________/  3.7 | 26 | 1.36 \105                          \

## 2023-11-10 NOTE — H&P ADULT - HISTORY OF PRESENT ILLNESS
78 year old male with PMHx HTN, HLD, prostate CA, aortic root enlargement with progressive SOB on exertion.  CTA thoracic aorta showing stable aorta size 4.8cm and extensive LAD and RCA calcification. Referred for [facility]

## 2023-11-10 NOTE — H&P ADULT - NSICDXPASTSURGICALHX_GEN_ALL_CORE_FT
PAST SURGICAL HISTORY:  H/O shoulder surgery     H/O total knee replacement, right     S/P carpal tunnel release

## 2023-11-10 NOTE — H&P ADULT - NSICDXFAMILYHX_GEN_ALL_CORE_FT
FAMILY HISTORY:  Mother  Still living? No  Family history of breast cancer in mother, Age at diagnosis: Age Unknown  FHx: hypertension, Age at diagnosis: Age Unknown

## 2023-11-10 NOTE — CHART NOTE - NSCHARTNOTEFT_GEN_A_CORE
Preop Phone Call:  - Able to Reach Patient:  yes  - Info given to: Mayito Jose A patient having cardiac catheterization  -  and allergies confirmed: yes  - Medication Information Given: yes  - Medications To Take all AM meds w/sip of water  - Medications To Hold: NA	  - Arrival Time:915  - NPO after:MN	  - Understanding of Information Verbalized: yes  will have a  over the age of 18  for d/c home  - Understanding of possible overnight stay if stent is placed: yes

## 2023-11-10 NOTE — H&P ADULT - NSICDXPASTMEDICALHX_GEN_ALL_CORE_FT
PAST MEDICAL HISTORY:  Aortic root dilation     Atrial premature beats     Bradycardia     Hypertension     Mixed hyperlipidemia

## 2023-11-13 ENCOUNTER — OUTPATIENT (OUTPATIENT)
Dept: OUTPATIENT SERVICES | Facility: HOSPITAL | Age: 78
LOS: 1 days | Discharge: HOME | End: 2023-11-13
Payer: MEDICARE

## 2023-11-13 VITALS
DIASTOLIC BLOOD PRESSURE: 85 MMHG | RESPIRATION RATE: 16 BRPM | SYSTOLIC BLOOD PRESSURE: 148 MMHG | HEART RATE: 66 BPM | OXYGEN SATURATION: 98 %

## 2023-11-13 VITALS
TEMPERATURE: 98 F | OXYGEN SATURATION: 98 % | SYSTOLIC BLOOD PRESSURE: 154 MMHG | WEIGHT: 195.11 LBS | DIASTOLIC BLOOD PRESSURE: 91 MMHG | RESPIRATION RATE: 16 BRPM | HEART RATE: 62 BPM

## 2023-11-13 DIAGNOSIS — R06.02 SHORTNESS OF BREATH: ICD-10-CM

## 2023-11-13 DIAGNOSIS — Z98.890 OTHER SPECIFIED POSTPROCEDURAL STATES: Chronic | ICD-10-CM

## 2023-11-13 DIAGNOSIS — Z96.651 PRESENCE OF RIGHT ARTIFICIAL KNEE JOINT: Chronic | ICD-10-CM

## 2023-11-13 LAB
ANION GAP SERPL CALC-SCNC: 6 MMOL/L — SIGNIFICANT CHANGE UP (ref 5–17)
ANION GAP SERPL CALC-SCNC: 6 MMOL/L — SIGNIFICANT CHANGE UP (ref 5–17)
BUN SERPL-MCNC: 17 MG/DL — SIGNIFICANT CHANGE UP (ref 7–23)
BUN SERPL-MCNC: 17 MG/DL — SIGNIFICANT CHANGE UP (ref 7–23)
CALCIUM SERPL-MCNC: 8.6 MG/DL — SIGNIFICANT CHANGE UP (ref 8.5–10.1)
CALCIUM SERPL-MCNC: 8.6 MG/DL — SIGNIFICANT CHANGE UP (ref 8.5–10.1)
CHLORIDE SERPL-SCNC: 109 MMOL/L — HIGH (ref 96–108)
CHLORIDE SERPL-SCNC: 109 MMOL/L — HIGH (ref 96–108)
CO2 SERPL-SCNC: 26 MMOL/L — SIGNIFICANT CHANGE UP (ref 22–31)
CO2 SERPL-SCNC: 26 MMOL/L — SIGNIFICANT CHANGE UP (ref 22–31)
CREAT SERPL-MCNC: 1.36 MG/DL — HIGH (ref 0.5–1.3)
CREAT SERPL-MCNC: 1.36 MG/DL — HIGH (ref 0.5–1.3)
EGFR: 54 ML/MIN/1.73M2 — LOW
EGFR: 54 ML/MIN/1.73M2 — LOW
GLUCOSE SERPL-MCNC: 105 MG/DL — HIGH (ref 70–99)
GLUCOSE SERPL-MCNC: 105 MG/DL — HIGH (ref 70–99)
HCT VFR BLD CALC: 32.3 % — LOW (ref 39–50)
HCT VFR BLD CALC: 32.3 % — LOW (ref 39–50)
HGB BLD-MCNC: 11 G/DL — LOW (ref 13–17)
HGB BLD-MCNC: 11 G/DL — LOW (ref 13–17)
MCHC RBC-ENTMCNC: 33 PG — SIGNIFICANT CHANGE UP (ref 27–34)
MCHC RBC-ENTMCNC: 33 PG — SIGNIFICANT CHANGE UP (ref 27–34)
MCHC RBC-ENTMCNC: 34.1 GM/DL — SIGNIFICANT CHANGE UP (ref 32–36)
MCHC RBC-ENTMCNC: 34.1 GM/DL — SIGNIFICANT CHANGE UP (ref 32–36)
MCV RBC AUTO: 97 FL — SIGNIFICANT CHANGE UP (ref 80–100)
MCV RBC AUTO: 97 FL — SIGNIFICANT CHANGE UP (ref 80–100)
PLATELET # BLD AUTO: 198 K/UL — SIGNIFICANT CHANGE UP (ref 150–400)
PLATELET # BLD AUTO: 198 K/UL — SIGNIFICANT CHANGE UP (ref 150–400)
POTASSIUM SERPL-MCNC: 3.7 MMOL/L — SIGNIFICANT CHANGE UP (ref 3.5–5.3)
POTASSIUM SERPL-MCNC: 3.7 MMOL/L — SIGNIFICANT CHANGE UP (ref 3.5–5.3)
POTASSIUM SERPL-SCNC: 3.7 MMOL/L — SIGNIFICANT CHANGE UP (ref 3.5–5.3)
POTASSIUM SERPL-SCNC: 3.7 MMOL/L — SIGNIFICANT CHANGE UP (ref 3.5–5.3)
RBC # BLD: 3.33 M/UL — LOW (ref 4.2–5.8)
RBC # BLD: 3.33 M/UL — LOW (ref 4.2–5.8)
RBC # FLD: 12.6 % — SIGNIFICANT CHANGE UP (ref 10.3–14.5)
RBC # FLD: 12.6 % — SIGNIFICANT CHANGE UP (ref 10.3–14.5)
SODIUM SERPL-SCNC: 141 MMOL/L — SIGNIFICANT CHANGE UP (ref 135–145)
SODIUM SERPL-SCNC: 141 MMOL/L — SIGNIFICANT CHANGE UP (ref 135–145)
WBC # BLD: 4.28 K/UL — SIGNIFICANT CHANGE UP (ref 3.8–10.5)
WBC # BLD: 4.28 K/UL — SIGNIFICANT CHANGE UP (ref 3.8–10.5)
WBC # FLD AUTO: 4.28 K/UL — SIGNIFICANT CHANGE UP (ref 3.8–10.5)
WBC # FLD AUTO: 4.28 K/UL — SIGNIFICANT CHANGE UP (ref 3.8–10.5)

## 2023-11-13 PROCEDURE — C1894: CPT

## 2023-11-13 PROCEDURE — C1769: CPT

## 2023-11-13 PROCEDURE — 36415 COLL VENOUS BLD VENIPUNCTURE: CPT

## 2023-11-13 PROCEDURE — 93454 CORONARY ARTERY ANGIO S&I: CPT

## 2023-11-13 PROCEDURE — C1887: CPT

## 2023-11-13 PROCEDURE — 80048 BASIC METABOLIC PNL TOTAL CA: CPT

## 2023-11-13 PROCEDURE — 85027 COMPLETE CBC AUTOMATED: CPT

## 2023-11-13 RX ORDER — ESCITALOPRAM OXALATE 10 MG/1
1 TABLET, FILM COATED ORAL
Refills: 0 | DISCHARGE

## 2023-11-13 RX ORDER — PREDNISOLONE 5 MG
1 TABLET ORAL
Refills: 0 | DISCHARGE

## 2023-11-13 RX ORDER — ALLOPURINOL 300 MG
1 TABLET ORAL
Refills: 0 | DISCHARGE

## 2023-11-13 RX ORDER — SODIUM CHLORIDE 9 MG/ML
250 INJECTION INTRAMUSCULAR; INTRAVENOUS; SUBCUTANEOUS ONCE
Refills: 0 | Status: COMPLETED | OUTPATIENT
Start: 2023-11-13 | End: 2023-11-13

## 2023-11-13 RX ORDER — LISINOPRIL 2.5 MG/1
1 TABLET ORAL
Refills: 0 | DISCHARGE

## 2023-11-13 RX ORDER — ABIRATERONE ACETATE 250 MG/1
2 TABLET ORAL
Refills: 0 | DISCHARGE

## 2023-11-13 RX ORDER — ATORVASTATIN CALCIUM 80 MG/1
1 TABLET, FILM COATED ORAL
Refills: 0 | DISCHARGE

## 2023-11-13 RX ORDER — PREDNISOLONE 5 MG
5 TABLET ORAL
Refills: 0 | DISCHARGE

## 2023-11-13 RX ORDER — SODIUM CHLORIDE 9 MG/ML
1000 INJECTION INTRAMUSCULAR; INTRAVENOUS; SUBCUTANEOUS
Refills: 0 | Status: DISCONTINUED | OUTPATIENT
Start: 2023-11-13 | End: 2023-11-13

## 2023-11-13 RX ORDER — TAMSULOSIN HYDROCHLORIDE 0.4 MG/1
1 CAPSULE ORAL
Refills: 0 | DISCHARGE

## 2023-11-13 RX ADMIN — SODIUM CHLORIDE 176 MILLILITER(S): 9 INJECTION INTRAMUSCULAR; INTRAVENOUS; SUBCUTANEOUS at 15:23

## 2023-11-13 RX ADMIN — SODIUM CHLORIDE 250 MILLILITER(S): 9 INJECTION INTRAMUSCULAR; INTRAVENOUS; SUBCUTANEOUS at 10:31

## 2023-11-13 NOTE — PROGRESS NOTE ADULT - SUBJECTIVE AND OBJECTIVE BOX
78 year old male with PMHx HTN, HLD, prostate CA, aortic root enlargement with progressive SOB on exertion. CTA thoracic aorta showing stable aorta size 4.8cm and extensive LAD and RCA calcification. Referred for [facility]   s/p LHC revealing non obstructive CAD. Denies chest pain, shortness of breath, dizziness or palpitations    PHYSICAL EXAM:  Constitutional: NAD,  Neuro: Alert and oriented x 3 Gait  Speech clear No focal deficits  Respiratory: CTAB  Cardiovascular: S1 and S2, RRR,   Gastrointestinal: BS+, soft, NT/ND  Extremities: No clubbing cyanosis or edema No varicosities  Vascular: 2+ peripheral pulses  Psychiatric: Normal mood, normal affect  Musculoskeletal: 5/5 strength b/l upper and lower extremities  Right groin procedure sheath pulled by RN; no bleeding, no hematoma, site soft, non tender, positive pedal pulses bilaterally  Vital Signs Last 24 Hrs  T(C): 36.9 (13 Nov 2023 10:00), Max: 36.9 (13 Nov 2023 10:00)  T(F): 98.4 (13 Nov 2023 10:00), Max: 98.4 (13 Nov 2023 10:00)  HR: 67 (13 Nov 2023 13:35) (62 - 74)  BP: 142/82 (13 Nov 2023 13:35) (142/82 - 160/77)  RR: 16 (13 Nov 2023 13:35) (16 - 16)  SpO2: 98% (13 Nov 2023 13:35) (98% - 100%)    HPI:  78 year old male with PMHx HTN, HLD, prostate CA, aortic root enlargement with progressive SOB on exertion.  CTA thoracic aorta showing stable aorta size 4.8cm and extensive LAD and RCA calcification. Referred for [facility]  (10 Nov 2023 17:46)    s/p [facility] revealing non obstructive CAD    -ambulate ad milan post bedrest  -iv hydration- TAMRA prevention  -encourage PO fluids  -plan of care discussed with patient and MD  -d/c after bedrest if stable  -post procedure and d/c instructions reviewed  -follow up with MD in 1-2 weeks  -Discussed therapeutic lifestyle changes to reduce risk factors such as following a cardiac diet, weight loss, maintaining a healthy weight, exercise, smoking cessation, medication compliance, and regular follow-up  with MD

## 2023-11-13 NOTE — PACU DISCHARGE NOTE - COMMENTS
d/c teaching done with teachback / iv d/judy, pt without c/o discomfort ,no s/s bleeding or hematoma

## 2023-11-14 ENCOUNTER — RX RENEWAL (OUTPATIENT)
Age: 78
End: 2023-11-14

## 2023-11-14 NOTE — POST DISCHARGE NOTE - DETAILS:
Post procedure phone call completed; patient understood all discharge paperwork. No questions regarding medications or pain management. MD follow up appointment made. Patient was able to rest when they were discharged. Patient's wife complained about wait time from time of arrival to the start of procedure. Instructed patient to contact provider with any further questions or concerns.

## 2023-11-16 DIAGNOSIS — I25.10 ATHEROSCLEROTIC HEART DISEASE OF NATIVE CORONARY ARTERY WITHOUT ANGINA PECTORIS: ICD-10-CM

## 2023-11-27 ENCOUNTER — NON-APPOINTMENT (OUTPATIENT)
Age: 78
End: 2023-11-27

## 2024-02-08 ENCOUNTER — APPOINTMENT (OUTPATIENT)
Dept: GASTROENTEROLOGY | Facility: CLINIC | Age: 79
End: 2024-02-08
Payer: MEDICARE

## 2024-02-08 VITALS
BODY MASS INDEX: 29.62 KG/M2 | DIASTOLIC BLOOD PRESSURE: 70 MMHG | WEIGHT: 200 LBS | SYSTOLIC BLOOD PRESSURE: 102 MMHG | HEIGHT: 69 IN

## 2024-02-08 DIAGNOSIS — Z12.11 ENCOUNTER FOR SCREENING FOR MALIGNANT NEOPLASM OF COLON: ICD-10-CM

## 2024-02-08 PROCEDURE — 99203 OFFICE O/P NEW LOW 30 MIN: CPT

## 2024-02-08 NOTE — HISTORY OF PRESENT ILLNESS
[FreeTextEntry1] : Mr. DELONTE TIWARI is a 78 year old male presents for screening colonoscopy. Patient has no complaints of bowel issues, bleeding, abdominal pain, family history of colon cancer, GERD symptoms.  Patient had last colonoscopy several years ago.  Patient was recently treated for prostate cancer with radiation.

## 2024-02-09 PROBLEM — R00.1 BRADYCARDIA, UNSPECIFIED: Chronic | Status: ACTIVE | Noted: 2023-11-10

## 2024-02-09 PROBLEM — I10 ESSENTIAL (PRIMARY) HYPERTENSION: Chronic | Status: ACTIVE | Noted: 2023-11-10

## 2024-02-09 PROBLEM — E78.2 MIXED HYPERLIPIDEMIA: Chronic | Status: ACTIVE | Noted: 2023-11-10

## 2024-02-09 PROBLEM — I49.1 ATRIAL PREMATURE DEPOLARIZATION: Chronic | Status: ACTIVE | Noted: 2023-11-10

## 2024-02-09 PROBLEM — I77.810 THORACIC AORTIC ECTASIA: Chronic | Status: ACTIVE | Noted: 2023-11-10

## 2024-02-19 ENCOUNTER — RX RENEWAL (OUTPATIENT)
Age: 79
End: 2024-02-19

## 2024-02-19 RX ORDER — ATORVASTATIN CALCIUM 40 MG/1
40 TABLET, FILM COATED ORAL
Qty: 90 | Refills: 0 | Status: ACTIVE | COMMUNITY
Start: 2021-05-14 | End: 1900-01-01

## 2024-05-29 RX ORDER — LISINOPRIL 40 MG/1
40 TABLET ORAL
Qty: 90 | Refills: 0 | Status: ACTIVE | COMMUNITY
Start: 2021-08-12 | End: 1900-01-01

## 2024-05-30 PROBLEM — I10 HYPERTENSION, UNSPECIFIED TYPE: Status: ACTIVE | Noted: 2020-12-07

## 2024-05-30 PROBLEM — I77.810 AORTIC ROOT DILATION: Status: ACTIVE | Noted: 2022-11-15

## 2024-05-30 PROBLEM — I77.810 ASCENDING AORTA DILATION: Status: ACTIVE | Noted: 2023-09-27

## 2024-05-30 NOTE — REASON FOR VISIT
[Home] : at home, [unfilled] , at the time of the visit. [Medical Office: ([facility])___] : at the medical office located in  [Patient] : the patient [Self] : self [This encounter was initiated by telehealth (audio with video) and converted to telephone (audio only) due to technical difficulties.] : This encounter was initiated by telehealth (audio with video) and converted to telephone (audio only) due to technical difficulties.

## 2024-06-04 ENCOUNTER — APPOINTMENT (OUTPATIENT)
Dept: CARDIOTHORACIC SURGERY | Facility: CLINIC | Age: 79
End: 2024-06-04
Payer: MEDICARE

## 2024-06-04 DIAGNOSIS — I10 ESSENTIAL (PRIMARY) HYPERTENSION: ICD-10-CM

## 2024-06-04 DIAGNOSIS — I77.810 THORACIC AORTIC ECTASIA: ICD-10-CM

## 2024-06-04 PROCEDURE — 99442: CPT | Mod: 93

## 2024-06-05 NOTE — HISTORY OF PRESENT ILLNESS
[FreeTextEntry1] : Jose A Edmond is a 79-year-old male referred Dr. Chasity Myers who presents for Aortic Registry follow up care. Past medical history includes hypertension, hypercholesteremia, left anterior fascicular block, arthritis, aortic root enlargement, bradycardia, and prostate cancer (2023 radiation therapy). There is no family history of sudden cardiac death or aortic aneurysm.   He was last seen in October 2023 with a Sinus of Valsalva measuring 4.8cm and ascending of 4.4 cm. He has a trileaflet aortic valve.   Today Mr. Edmond reports that he is feeling well; he denies chest pain, palpitations, dizziness, syncope, lower extremity edema, shortness of breath, weight loss/weight gain.

## 2024-06-05 NOTE — DATA REVIEWED
[FreeTextEntry1] : CT ANGIOGRAPHY CHEST  from [facility] on 05/01/24 COMPARISON:  10/4/2023    FINDINGS: CHEST  THYROID: Visualized portion is unremarkable.  LUNGS: Unremarkable.  TRACHEA AND BRONCHI: Unremarkable.  HEART AND PERICARDIUM: There is moderate coronary artery calcification.  VASCULATURE: Main pulmonary artery 2.7 cm. Aortic measurements include: Sinus of Valsalva 5.0 cm. Proximal ascending thoracic aorta, 4.3 cm. Mid aortic arch, 2.9 cm. MID descending thoracic aorta 2.6 cm. Distal descending 2.4 cm.  LYMPH NODES AND MEDIASTINUM: Unremarkable.  SOFT TISSUES: Unremarkable.  BONES: Unremarkable.  FINDINGS: VISUALIZED PORTION OF THE ABDOMEN  GASTROESOPHAGEAL JUNCTION: Unremarkable. OTHER ORGANS: Unremarkable.   IMPRESSION: 1. Dilated proximal thoracic aorta: Sinus of Valsalva, 5.0 cm; proximal ascending thoracic aorta 4.3 cm. Similar to previously. 2. Moderate coronary artery calcifications.       Transthoracic Echocardiogram from 11/03/23 at [facility] - LVEF 60-65% - The left atrium is mildly dilated - The aortic valve is trileaflet without stenosis         CTA of the chest from [facility] on 10/04/23 COMPARISON:  No prior CT for comparison. Correlation made with MRA chest 10/24/2022.    FINDINGS:   AORTA AND BRANCH ARTERIES: There is dilatation aortic root and ascending thoracic aorta. Minimal enlargement aortic root and ascending thoracic aorta since prior MRI may be secondary to differences in imaging technique. There is mild calcification aortic root and arch. Current aortic dimensions as follows:         Sinus of Valsalva:        4.8 cm        Sinotubular Junction:   3.9 cm        Ascending Aorta:         4.4 cm        Mid aortic Arch:            3.0 cm        Descending Aorta           proximal                      2.6 cm           distal                           2.5 cm         Abdominal Aorta                   Suprarenal                2.5 cm             Infrarenal                   2.0 cm  Conventional origin of the great vessels. Minimal calcified plaque proximal left subclavian artery and right innominate artery. No occlusive disease or aneurysm. Visualized carotid and vertebral arteries are unremarkable. Left vertebral artery dominant.  Mild calcification upper abdominal aorta. Calcified plaque at the origin celiac and SMA without significant stenosis. Calcified plaque at the origin left renal artery without significant stenosis.  PULMONARY ARTERIES: Main pulmonary artery normal caliber. No central embolic defects.  HEART: Visualized cardiac chambers are within normal limits. Evaluation aortic valve technically limited due to motion artifact. Mild calcification mitral annulus. No pericardial thickening or fluid. Extensive calcified plaque LAD coronary artery. Moderate calcified plaque RCA.  MEDIASTINUM/[place]: No lymphadenopathy or mass.  LUNGS: No pulmonary infiltrate, mass or parenchymal disease.  PLEURA: No pleural fluid or thickening.   UPPER ABDOMEN: Small hiatal hernia. Visualized upper abdomen otherwise unremarkable.  SOFT TISSUES: Normal.  BONES: Moderate spondylosis thoracic spine.  IMPRESSION:   Dilated aortic root, 4.8 cm, and ascending thoracic aorta, 4.4 cm. Minimal enlargement in aortic dimensions since prior MRA 10/24/2022 may be secondary to differences in imaging technique.  Extensive calcified coronary artery plaque.  Mild calcification aortic root and mitral annulus.  Incidental finding: Small hiatal hernia.       MR ANGIOGRAPHY CHEST WITHOUT OR WITH CONTRAST performed through [facility] on 10/24/22:  HISTORY:  Dilated aortic root.  TECHNIQUE:  Noncontrast MR angiogram of the chest was performed utilizing a 1.5 Anna magnet with bright blood and dark blood imaging. The patient declined intravenous contrast.  COMPARISON:  MR angiogram chest with contrast from 10/14/2021  FINDINGS:  Stable 4.5 cm dilated aortic root and 4.2 cm dilated ascending aorta. The transverse arch and descending thoracic aorta are normal in caliber.  The heart is unremarkable without evidence of pericardial effusion.  No mediastinal mass or lymphadenopathy.  No pleural effusion or abnormal signal in the lung fields.  Visualized upper abdomen is unremarkable.  Osseous structures are within normal limits.  IMPRESSION:  Stable 4.5 cm dilated aortic root and 4.2 cm dilated ascending aorta.

## 2024-06-05 NOTE — ASSESSMENT
[FreeTextEntry1] : Jose A Edmond is a 79-year-old male referred Dr. Chasity Myers who presents for Aortic Registry follow up care. Past medical history includes hypertension, hypercholesteremia, left anterior fascicular block, arthritis, aortic root enlargement, bradycardia, and prostate cancer (2023 radiation therapy). There is no family history of sudden cardiac death or aortic aneurysm.   I have independently reviewed the medical records and imaging at the time of this office consultation, and discussed the following interpretations with Mr. EDMOND. Today we discussed the disease process with regards to Mr. EDMOND's ascending aortic aneurysm. This is not something that we can change or reduce in size medically but something that we monitor for the long term, assessing for growth in total size and rate of change.   After review of the 5/1/24 CTA chest, the current measurement of Mr. Edmond's sinus of Valsalva is 5.0cm and proximal ascending thoracic aorta is 4.3cm, which we do not yet consider indications for surgery. Surgery is generally indicated when the aortic size is closer to 5.5 cm for a trileaflet aortic valve. However, there is noted increase from previous imaging - on 10/4/2023 CTA chest sinus of Valsalva 4.8cm and ascending aorta 4.4cm; on 10/24/2022 MRA chest aortic root 4.5cm and ascending aorta 4.2cm. Given these progressive increases, closer monitoring is warranted.  On 11/3/2023 echocardiogram imaging, there is mild calcification of the aortic valve and trace aortic regurgitation; there is no evidence of aortic stenosis. Protective strategies were discussed with Mr. EDMOND include close blood pressure management and prevention of very heavy or prolonged lifting. We discussed the importance of follow up care with cardiology and for blood pressure monitoring.   We will be continuing use of the Aortic Registry and he is to follow up with the next surveillance CT scan chest with IV contrast (GATED) in 6 months. The patient was in full understanding of and in agreement with plan going forward. All questions answered and concerns were addressed.   Plan: - CTA chest in 6 months (GATED) - Continue Aortic Registry - Hypertension and coronary artery disease management with Dr. Chasity Myers - Maintain routine follow up with Primary Care and Cardiology Providers - Return to care to review imaging in 6 months, or sooner if needed  ** Today's visit was conducted via telephone as technical difficulties were encountered with Telehealth ** Total time spent on this encounter was 15 minutes   I, Dr. Reyes, personally performed the evaluation and management (E/M) services for this established patient who presents today with an existing condition(s).  That E/M includes conducting the examination, assessing all new/exacerbated conditions, and establishing a new plan of care.  Today, my ACP, Candace Martinez NP, was here to observe my evaluation and management services for this new problem/exacerbated condition to be followed going forward.

## 2024-06-17 RX ORDER — ALLOPURINOL 300 MG/1
300 TABLET ORAL DAILY
Qty: 90 | Refills: 3 | Status: ACTIVE | COMMUNITY
Start: 2023-02-23 | End: 1900-01-01

## 2024-07-02 ENCOUNTER — APPOINTMENT (OUTPATIENT)
Dept: GASTROENTEROLOGY | Facility: AMBULATORY MEDICAL SERVICES | Age: 79
End: 2024-07-02
Payer: MEDICARE

## 2024-07-02 PROCEDURE — 45378 DIAGNOSTIC COLONOSCOPY: CPT | Mod: PT

## 2024-08-06 ENCOUNTER — TRANSCRIPTION ENCOUNTER (OUTPATIENT)
Age: 79
End: 2024-08-06

## 2024-08-17 PROBLEM — R73.01 IMPAIRED FASTING GLUCOSE: Status: ACTIVE | Noted: 2024-08-15

## 2024-08-19 ENCOUNTER — APPOINTMENT (OUTPATIENT)
Dept: FAMILY MEDICINE | Facility: CLINIC | Age: 79
End: 2024-08-19
Payer: MEDICARE

## 2024-08-19 VITALS
WEIGHT: 197 LBS | HEART RATE: 100 BPM | HEIGHT: 69 IN | BODY MASS INDEX: 29.18 KG/M2 | SYSTOLIC BLOOD PRESSURE: 130 MMHG | DIASTOLIC BLOOD PRESSURE: 68 MMHG | OXYGEN SATURATION: 95 % | TEMPERATURE: 97.8 F

## 2024-08-19 DIAGNOSIS — I10 ESSENTIAL (PRIMARY) HYPERTENSION: ICD-10-CM

## 2024-08-19 DIAGNOSIS — C61 MALIGNANT NEOPLASM OF PROSTATE: ICD-10-CM

## 2024-08-19 DIAGNOSIS — R73.01 IMPAIRED FASTING GLUCOSE: ICD-10-CM

## 2024-08-19 DIAGNOSIS — M10.9 GOUT, UNSPECIFIED: ICD-10-CM

## 2024-08-19 DIAGNOSIS — E78.5 HYPERLIPIDEMIA, UNSPECIFIED: ICD-10-CM

## 2024-08-19 DIAGNOSIS — N18.31 CHRONIC KIDNEY DISEASE, STAGE 3A: ICD-10-CM

## 2024-08-19 PROCEDURE — 99214 OFFICE O/P EST MOD 30 MIN: CPT

## 2024-08-19 NOTE — HISTORY OF PRESENT ILLNESS
[FreeTextEntry1] : DELONTE TIWARI is a 79 year old male here for a follow up visit. [de-identified] : He has a history of hypertension, hyperlipidemia, impaired fasting glucose, aortic root dilation, chronic kidney disease, prostate cancer, and gout.  His last visit was in 11/2023 for his annual physical. He had just completed radiation treatment for prostate cancer. He was on on Lupron and Abiraterone. He was feeling fatigued.  He presents today with elevated blood pressure. He feels that this started when he completed the treatment for prostate cancer.

## 2024-08-19 NOTE — PLAN
[FreeTextEntry1] : Continue all medications as prescribed. His blood pressure is stable on his current medication regimen.   Will check labs when he returns fasting for his physical in November.  Reviewed age-appropriate preventive screening tests with patient.  Discussed clean eating (eg Mediterranean style eating plan) and regular exercise/staying as physically active as possible.  Include balance exercises and strength training and core strengthening exercises for bone health and to decrease risk for falls.  Reviewed importance of good self care (e.g. meditation, yoga, adequate rest, regular exercise, magnesium, clean eating, etc.).  Follow up for next physical in 11/2024 as scheduled.  Face-to-face time spent with patient, over half in discussion of the above diagnoses and treatment plan: 30 minutes.

## 2024-08-19 NOTE — HISTORY OF PRESENT ILLNESS
[FreeTextEntry1] : DELONTE TIWARI is a 79 year old male here for a follow up visit. [de-identified] : He has a history of hypertension, hyperlipidemia, impaired fasting glucose, aortic root dilation, chronic kidney disease, prostate cancer, and gout.  His last visit was in 11/2023 for his annual physical. He had just completed radiation treatment for prostate cancer. He was on on Lupron and Abiraterone. He was feeling fatigued.  He presents today with elevated blood pressure. He feels that this started when he completed the treatment for prostate cancer.

## 2024-08-19 NOTE — HEALTH RISK ASSESSMENT
[No falls in past year] : Patient reported no falls in the past year [0] : 2) Feeling down, depressed, or hopeless: Not at all (0) [PHQ-2 Negative - No further assessment needed] : PHQ-2 Negative - No further assessment needed [Never] : Never [[id number]] : 0

## 2024-08-19 NOTE — ASSESSMENT
[FreeTextEntry1] : He is here to follow up on hypertension, hyperlipidemia, impaired fasting glucose, aortic root dilation, chronic kidney disease, prostate cancer, and gout.  He is concerned that his blood pressure has been elevated for the past few weeks. It was 160/100 this morning. His blood pressure was low when he was on Abiaterone and Prednisone but he completed these about a month ago. His last Lupron shot was 3 months ago but he is still having hot flashes.   He is still on Lisinopril for hypertension. He states that his resting pulse is in the 50s but it goes up to the 80s with activity. His blood pressure is higher when his pulse is slow and lower when his pulse is fast. He has no symptoms of hypertension including headaches, blurry vision, chest pressure, etc.  His cardiologist increased his Atorvastatin from 40 to 60 mg about 2 months ago. He has not had his labs checked since then but he is not fasting today.

## 2024-08-19 NOTE — HEALTH RISK ASSESSMENT
[No falls in past year] : Patient reported no falls in the past year [0] : 2) Feeling down, depressed, or hopeless: Not at all (0) [PHQ-2 Negative - No further assessment needed] : PHQ-2 Negative - No further assessment needed [Never] : Never [[id number]] : 0

## 2024-10-25 ENCOUNTER — APPOINTMENT (OUTPATIENT)
Dept: GASTROENTEROLOGY | Facility: CLINIC | Age: 79
End: 2024-10-25
Payer: MEDICARE

## 2024-10-25 VITALS
DIASTOLIC BLOOD PRESSURE: 82 MMHG | SYSTOLIC BLOOD PRESSURE: 136 MMHG | WEIGHT: 198 LBS | BODY MASS INDEX: 29.33 KG/M2 | HEIGHT: 69 IN

## 2024-10-25 DIAGNOSIS — R19.5 OTHER FECAL ABNORMALITIES: ICD-10-CM

## 2024-10-25 DIAGNOSIS — K62.7 RADIATION PROCTITIS: ICD-10-CM

## 2024-10-25 PROCEDURE — 99213 OFFICE O/P EST LOW 20 MIN: CPT

## 2024-11-07 ENCOUNTER — APPOINTMENT (OUTPATIENT)
Dept: FAMILY MEDICINE | Facility: CLINIC | Age: 79
End: 2024-11-07
Payer: MEDICARE

## 2024-11-07 VITALS
HEART RATE: 85 BPM | BODY MASS INDEX: 28.88 KG/M2 | OXYGEN SATURATION: 97 % | HEIGHT: 69 IN | WEIGHT: 195 LBS | DIASTOLIC BLOOD PRESSURE: 68 MMHG | TEMPERATURE: 97.8 F | SYSTOLIC BLOOD PRESSURE: 110 MMHG

## 2024-11-07 DIAGNOSIS — Z00.00 ENCOUNTER FOR GENERAL ADULT MEDICAL EXAMINATION W/OUT ABNORMAL FINDINGS: ICD-10-CM

## 2024-11-07 DIAGNOSIS — E78.5 HYPERLIPIDEMIA, UNSPECIFIED: ICD-10-CM

## 2024-11-07 DIAGNOSIS — N18.31 CHRONIC KIDNEY DISEASE, STAGE 3A: ICD-10-CM

## 2024-11-07 DIAGNOSIS — M10.9 GOUT, UNSPECIFIED: ICD-10-CM

## 2024-11-07 DIAGNOSIS — I10 ESSENTIAL (PRIMARY) HYPERTENSION: ICD-10-CM

## 2024-11-07 DIAGNOSIS — I77.810 THORACIC AORTIC ECTASIA: ICD-10-CM

## 2024-11-07 DIAGNOSIS — C61 MALIGNANT NEOPLASM OF PROSTATE: ICD-10-CM

## 2024-11-07 PROCEDURE — G0439: CPT

## 2024-11-07 PROCEDURE — 36415 COLL VENOUS BLD VENIPUNCTURE: CPT

## 2024-11-07 PROCEDURE — 90656 IIV3 VACC NO PRSV 0.5 ML IM: CPT

## 2024-11-07 PROCEDURE — G0008: CPT

## 2024-11-07 RX ORDER — METOPROLOL SUCCINATE 25 MG/1
25 TABLET, EXTENDED RELEASE ORAL
Refills: 0 | Status: ACTIVE | COMMUNITY

## 2024-11-07 RX ORDER — ATORVASTATIN CALCIUM 20 MG/1
20 TABLET, FILM COATED ORAL
Qty: 90 | Refills: 3 | Status: ACTIVE | COMMUNITY
Start: 1900-01-01 | End: 1900-01-01

## 2024-11-08 ENCOUNTER — TRANSCRIPTION ENCOUNTER (OUTPATIENT)
Age: 79
End: 2024-11-08

## 2024-11-08 DIAGNOSIS — D63.1 CHRONIC KIDNEY DISEASE, UNSPECIFIED: ICD-10-CM

## 2024-11-08 DIAGNOSIS — N18.9 CHRONIC KIDNEY DISEASE, UNSPECIFIED: ICD-10-CM

## 2024-11-09 ENCOUNTER — TRANSCRIPTION ENCOUNTER (OUTPATIENT)
Age: 79
End: 2024-11-09

## 2024-11-09 LAB
FERRITIN SERPL-MCNC: 609 NG/ML
FOLATE SERPL-MCNC: >20 NG/ML
IRON SATN MFR SERPL: 34 %
IRON SERPL-MCNC: 84 UG/DL
TIBC SERPL-MCNC: 250 UG/DL
UIBC SERPL-MCNC: 166 UG/DL
VIT B12 SERPL-MCNC: 453 PG/ML

## 2024-12-04 ENCOUNTER — RX RENEWAL (OUTPATIENT)
Age: 79
End: 2024-12-04

## 2024-12-12 ENCOUNTER — APPOINTMENT (OUTPATIENT)
Dept: FAMILY MEDICINE | Facility: CLINIC | Age: 79
End: 2024-12-12
Payer: MEDICARE

## 2024-12-12 VITALS
HEART RATE: 84 BPM | OXYGEN SATURATION: 96 % | DIASTOLIC BLOOD PRESSURE: 72 MMHG | SYSTOLIC BLOOD PRESSURE: 126 MMHG | HEIGHT: 69 IN | BODY MASS INDEX: 30.07 KG/M2 | TEMPERATURE: 97.6 F | WEIGHT: 203 LBS

## 2024-12-12 DIAGNOSIS — E78.5 HYPERLIPIDEMIA, UNSPECIFIED: ICD-10-CM

## 2024-12-12 DIAGNOSIS — M10.9 GOUT, UNSPECIFIED: ICD-10-CM

## 2024-12-12 DIAGNOSIS — D63.1 CHRONIC KIDNEY DISEASE, UNSPECIFIED: ICD-10-CM

## 2024-12-12 DIAGNOSIS — N18.9 CHRONIC KIDNEY DISEASE, UNSPECIFIED: ICD-10-CM

## 2024-12-12 DIAGNOSIS — N18.31 CHRONIC KIDNEY DISEASE, STAGE 3A: ICD-10-CM

## 2024-12-12 DIAGNOSIS — I10 ESSENTIAL (PRIMARY) HYPERTENSION: ICD-10-CM

## 2024-12-12 DIAGNOSIS — C61 MALIGNANT NEOPLASM OF PROSTATE: ICD-10-CM

## 2024-12-12 DIAGNOSIS — R73.01 IMPAIRED FASTING GLUCOSE: ICD-10-CM

## 2024-12-12 DIAGNOSIS — K62.7 RADIATION PROCTITIS: ICD-10-CM

## 2024-12-12 PROCEDURE — 99214 OFFICE O/P EST MOD 30 MIN: CPT

## 2024-12-18 RX ORDER — SEMAGLUTIDE 0.25 MG/.5ML
0.25 INJECTION, SOLUTION SUBCUTANEOUS
Qty: 1 | Refills: 3 | Status: ACTIVE | COMMUNITY
Start: 2024-12-12

## 2025-02-28 ENCOUNTER — TRANSCRIPTION ENCOUNTER (OUTPATIENT)
Age: 80
End: 2025-02-28

## 2025-03-02 PROBLEM — E53.8 VITAMIN B12 DEFICIENCY: Status: ACTIVE | Noted: 2025-03-02

## 2025-03-07 ENCOUNTER — APPOINTMENT (OUTPATIENT)
Dept: FAMILY MEDICINE | Facility: CLINIC | Age: 80
End: 2025-03-07
Payer: MEDICARE

## 2025-03-07 VITALS
TEMPERATURE: 97.1 F | SYSTOLIC BLOOD PRESSURE: 108 MMHG | WEIGHT: 205 LBS | HEART RATE: 91 BPM | DIASTOLIC BLOOD PRESSURE: 68 MMHG | HEIGHT: 69 IN | BODY MASS INDEX: 30.36 KG/M2 | OXYGEN SATURATION: 96 %

## 2025-03-07 DIAGNOSIS — C61 MALIGNANT NEOPLASM OF PROSTATE: ICD-10-CM

## 2025-03-07 DIAGNOSIS — I10 ESSENTIAL (PRIMARY) HYPERTENSION: ICD-10-CM

## 2025-03-07 DIAGNOSIS — E78.5 HYPERLIPIDEMIA, UNSPECIFIED: ICD-10-CM

## 2025-03-07 DIAGNOSIS — I77.810 THORACIC AORTIC ECTASIA: ICD-10-CM

## 2025-03-07 DIAGNOSIS — N18.31 CHRONIC KIDNEY DISEASE, STAGE 3A: ICD-10-CM

## 2025-03-07 DIAGNOSIS — N18.9 CHRONIC KIDNEY DISEASE, UNSPECIFIED: ICD-10-CM

## 2025-03-07 DIAGNOSIS — M10.9 GOUT, UNSPECIFIED: ICD-10-CM

## 2025-03-07 DIAGNOSIS — D63.1 CHRONIC KIDNEY DISEASE, UNSPECIFIED: ICD-10-CM

## 2025-03-07 DIAGNOSIS — R73.01 IMPAIRED FASTING GLUCOSE: ICD-10-CM

## 2025-03-07 PROCEDURE — 99214 OFFICE O/P EST MOD 30 MIN: CPT

## 2025-03-07 PROCEDURE — 36415 COLL VENOUS BLD VENIPUNCTURE: CPT

## 2025-03-07 RX ORDER — PNV NO.95/FERROUS FUM/FOLIC AC 28MG-0.8MG
TABLET ORAL
Refills: 0 | Status: ACTIVE | COMMUNITY

## 2025-03-08 ENCOUNTER — TRANSCRIPTION ENCOUNTER (OUTPATIENT)
Age: 80
End: 2025-03-08

## 2025-04-11 ENCOUNTER — NON-APPOINTMENT (OUTPATIENT)
Age: 80
End: 2025-04-11

## 2025-06-02 ENCOUNTER — TRANSCRIPTION ENCOUNTER (OUTPATIENT)
Age: 80
End: 2025-06-02

## 2025-06-03 ENCOUNTER — APPOINTMENT (OUTPATIENT)
Dept: CARDIOTHORACIC SURGERY | Facility: CLINIC | Age: 80
End: 2025-06-03
Payer: MEDICARE

## 2025-06-03 VITALS — HEIGHT: 69 IN | BODY MASS INDEX: 31.1 KG/M2 | WEIGHT: 210 LBS

## 2025-06-03 DIAGNOSIS — I77.810 THORACIC AORTIC ECTASIA: ICD-10-CM

## 2025-06-03 PROCEDURE — 99213 OFFICE O/P EST LOW 20 MIN: CPT | Mod: 93

## 2025-06-03 RX ORDER — ALFUZOSIN HYDROCHLORIDE 10 MG/1
10 TABLET, EXTENDED RELEASE ORAL
Refills: 0 | Status: ACTIVE | COMMUNITY

## 2025-06-16 ENCOUNTER — TRANSCRIPTION ENCOUNTER (OUTPATIENT)
Age: 80
End: 2025-06-16

## 2025-08-12 ENCOUNTER — RX RENEWAL (OUTPATIENT)
Age: 80
End: 2025-08-12